# Patient Record
Sex: MALE | Race: WHITE
[De-identification: names, ages, dates, MRNs, and addresses within clinical notes are randomized per-mention and may not be internally consistent; named-entity substitution may affect disease eponyms.]

---

## 2017-03-30 ENCOUNTER — HOSPITAL ENCOUNTER (EMERGENCY)
Dept: HOSPITAL 17 - NEPE | Age: 62
Discharge: HOME | End: 2017-03-30
Payer: MEDICARE

## 2017-03-30 VITALS — TEMPERATURE: 99.6 F

## 2017-03-30 VITALS
SYSTOLIC BLOOD PRESSURE: 151 MMHG | HEART RATE: 77 BPM | DIASTOLIC BLOOD PRESSURE: 80 MMHG | OXYGEN SATURATION: 100 % | RESPIRATION RATE: 16 BRPM

## 2017-03-30 VITALS — WEIGHT: 284.4 LBS | HEIGHT: 69 IN | BODY MASS INDEX: 42.12 KG/M2

## 2017-03-30 VITALS
RESPIRATION RATE: 14 BRPM | SYSTOLIC BLOOD PRESSURE: 139 MMHG | DIASTOLIC BLOOD PRESSURE: 75 MMHG | TEMPERATURE: 99.6 F | HEART RATE: 88 BPM | OXYGEN SATURATION: 95 %

## 2017-03-30 DIAGNOSIS — Z87.39: ICD-10-CM

## 2017-03-30 DIAGNOSIS — G80.9: ICD-10-CM

## 2017-03-30 DIAGNOSIS — I10: ICD-10-CM

## 2017-03-30 DIAGNOSIS — M54.5: Primary | ICD-10-CM

## 2017-03-30 DIAGNOSIS — E78.00: ICD-10-CM

## 2017-03-30 DIAGNOSIS — R19.7: ICD-10-CM

## 2017-03-30 DIAGNOSIS — R11.10: ICD-10-CM

## 2017-03-30 DIAGNOSIS — Z86.79: ICD-10-CM

## 2017-03-30 DIAGNOSIS — Z87.19: ICD-10-CM

## 2017-03-30 DIAGNOSIS — Z86.59: ICD-10-CM

## 2017-03-30 DIAGNOSIS — Z87.448: ICD-10-CM

## 2017-03-30 DIAGNOSIS — F17.200: ICD-10-CM

## 2017-03-30 LAB
ALP SERPL-CCNC: 75 U/L (ref 45–117)
ALT SERPL-CCNC: 31 U/L (ref 12–78)
ANION GAP SERPL CALC-SCNC: 8 MEQ/L (ref 5–15)
AST SERPL-CCNC: 38 U/L (ref 15–37)
BASOPHILS # BLD AUTO: 0 TH/MM3 (ref 0–0.2)
BASOPHILS NFR BLD: 0.2 % (ref 0–2)
BILIRUB SERPL-MCNC: 0.8 MG/DL (ref 0.2–1)
BUN SERPL-MCNC: 21 MG/DL (ref 7–18)
CHLORIDE SERPL-SCNC: 105 MEQ/L (ref 98–107)
COLOR UR: YELLOW
COMMENT (UR): (no result)
CULTURE IF INDICATED: (no result)
EOSINOPHIL # BLD: 0 TH/MM3 (ref 0–0.4)
EOSINOPHIL NFR BLD: 0.2 % (ref 0–4)
ERYTHROCYTE [DISTWIDTH] IN BLOOD BY AUTOMATED COUNT: 12.8 % (ref 11.6–17.2)
GFR SERPLBLD BASED ON 1.73 SQ M-ARVRAT: 60 ML/MIN (ref 89–?)
GLUCOSE UR STRIP-MCNC: (no result) MG/DL
HCO3 BLD-SCNC: 24.1 MEQ/L (ref 21–32)
HCT VFR BLD CALC: 49.7 % (ref 39–51)
HEMO FLAGS: (no result)
HGB UR QL STRIP: (no result)
KETONES UR STRIP-MCNC: (no result) MG/DL
LYMPHOCYTES # BLD AUTO: 0.6 TH/MM3 (ref 1–4.8)
LYMPHOCYTES NFR BLD AUTO: 6.8 % (ref 9–44)
MCH RBC QN AUTO: 32.1 PG (ref 27–34)
MCHC RBC AUTO-ENTMCNC: 34.6 % (ref 32–36)
MCV RBC AUTO: 92.8 FL (ref 80–100)
MONOCYTES NFR BLD: 3.3 % (ref 0–8)
MUCOUS THREADS #/AREA URNS LPF: (no result) /LPF
NEUTROPHILS # BLD AUTO: 8.5 TH/MM3 (ref 1.8–7.7)
NEUTROPHILS NFR BLD AUTO: 89.5 % (ref 16–70)
NITRITE UR QL STRIP: (no result)
PLATELET # BLD: 181 TH/MM3 (ref 150–450)
POTASSIUM SERPL-SCNC: 4.5 MEQ/L (ref 3.5–5.1)
RBC # BLD AUTO: 5.36 MIL/MM3 (ref 4.5–5.9)
SODIUM SERPL-SCNC: 137 MEQ/L (ref 136–145)
SP GR UR STRIP: 1.02 (ref 1–1.03)
SQUAMOUS #/AREA URNS HPF: <1 /HPF (ref 0–5)
WBC # BLD AUTO: 9.5 TH/MM3 (ref 4–11)

## 2017-03-30 PROCEDURE — 80053 COMPREHEN METABOLIC PANEL: CPT

## 2017-03-30 PROCEDURE — 96374 THER/PROPH/DIAG INJ IV PUSH: CPT

## 2017-03-30 PROCEDURE — 81001 URINALYSIS AUTO W/SCOPE: CPT

## 2017-03-30 PROCEDURE — 74176 CT ABD & PELVIS W/O CONTRAST: CPT

## 2017-03-30 PROCEDURE — 96375 TX/PRO/DX INJ NEW DRUG ADDON: CPT

## 2017-03-30 PROCEDURE — 99284 EMERGENCY DEPT VISIT MOD MDM: CPT

## 2017-03-30 PROCEDURE — 85025 COMPLETE CBC W/AUTO DIFF WBC: CPT

## 2017-03-30 PROCEDURE — 96361 HYDRATE IV INFUSION ADD-ON: CPT

## 2017-03-30 NOTE — PD
Data


Data


Last Documented VS





Vital Signs








  Date Time  Temp Pulse Resp B/P Pulse Ox O2 Delivery O2 Flow Rate FiO2


 


3/30/17 06:14 99.6 88 14 139/75 95 Room Air  








Orders





 Complete Blood Count With Diff (3/30/17 06:42)


Comprehensive Metabolic Panel (3/30/17 06:42)


Urinalysis - C+S If Indicated (3/30/17 06:42)


Ct Abd/Pel W/O Iv Contrast (3/30/17 )


Ondansetron Inj (Zofran Inj) (3/30/17 06:45)


Sodium Chlor 0.9% 1000 Ml Inj (Ns 1000 M (3/30/17 06:45)


Ketorolac Inj (Toradol Inj) (3/30/17 06:45)





Labs





 Laboratory Tests








Test 3/30/17 3/30/17





 06:46 07:10


 


White Blood Count 9.5 TH/MM3 


 


Red Blood Count 5.36 MIL/MM3 


 


Hemoglobin 17.2 GM/DL 


 


Hematocrit 49.7 % 


 


Mean Corpuscular Volume 92.8 FL 


 


Mean Corpuscular Hemoglobin 32.1 PG 


 


Mean Corpuscular Hemoglobin 34.6 % 





Concent  


 


Red Cell Distribution Width 12.8 % 


 


Platelet Count 181 TH/MM3 


 


Mean Platelet Volume 7.8 FL 


 


Neutrophils (%) (Auto) 89.5 % 


 


Lymphocytes (%) (Auto) 6.8 % 


 


Monocytes (%) (Auto) 3.3 % 


 


Eosinophils (%) (Auto) 0.2 % 


 


Basophils (%) (Auto) 0.2 % 


 


Neutrophils # (Auto) 8.5 TH/MM3 


 


Lymphocytes # (Auto) 0.6 TH/MM3 


 


Monocytes # (Auto) 0.3 TH/MM3 


 


Eosinophils # (Auto) 0.0 TH/MM3 


 


Basophils # (Auto) 0.0 TH/MM3 


 


CBC Comment DIFF FINAL  


 


Differential Comment   


 


Sodium Level 137 MEQ/L 


 


Potassium Level 4.5 MEQ/L 


 


Chloride Level 105 MEQ/L 


 


Carbon Dioxide Level 24.1 MEQ/L 


 


Anion Gap 8 MEQ/L 


 


Blood Urea Nitrogen 21 MG/DL 


 


Creatinine 1.23 MG/DL 


 


Estimat Glomerular Filtration 60 ML/MIN 





Rate  


 


Random Glucose 102 MG/DL 


 


Calcium Level 9.0 MG/DL 


 


Total Bilirubin 0.8 MG/DL 


 


Aspartate Amino Transf 38 U/L 





(AST/SGOT)  


 


Alanine Aminotransferase 31 U/L 





(ALT/SGPT)  


 


Alkaline Phosphatase 75 U/L 


 


Total Protein 7.3 GM/DL 


 


Albumin 3.8 GM/DL 


 


Urine Color  YELLOW 


 


Urine Turbidity  CLEAR 


 


Urine pH  5.0 


 


Urine Specific Gravity  1.022 


 


Urine Protein  NEG mg/dL


 


Urine Glucose (UA)  NEG mg/dL


 


Urine Ketones  NEG mg/dL


 


Urine Occult Blood  NEG 


 


Urine Nitrite  NEG 


 


Urine Bilirubin  NEG 


 


Urine Urobilinogen  LESS THAN 2.0





  MG/DL


 


Urine Leukocyte Esterase  NEG 


 


Urine RBC  LESS THAN 1





  /hpf


 


Urine WBC  LESS THAN 1





  /hpf


 


Urine Squamous Epithelial  <1 /hpf





Cells  


 


Urine Mucus  FEW /lpf


 


Microscopic Urinalysis Comment  CULT NOT





  INDICATED











MDM


Supervised Visit with ALIZE:  No


Narrative Course


This case is checked out to me by Dr. Mcwilliams at 7 AM.





I have reevaluated the patient and discussed the entirety of the workup with 

him.  The urine is clean and labs are normal.  CT abdomen and pelvis shows no 

sign of kidney stone.  There are some incidental findings such as 

cholelithiasis and diverticulosis.  He is resting comfortably and feels much 

better.  He is stable for outpatient primary care follow-up.


Diagnosis





 Primary Impression:  


 Back pain


 Qualified Code:  M54.5 - Acute low back pain without sciatica, unspecified 

back pain laterality





***Additional Instruction:


The patient was advised to follow up with their physician and return if they 

worsen.


***Med/Other Pt SpecificInfo:  Other


Disposition:  01 DISCHARGE HOME


Condition:  Stable








Florencio Kumar MD Mar 30, 2017 08:39

## 2017-03-30 NOTE — PD
HPI


Chief Complaint:  Flank/Kidney Pain


Time Seen by Provider:  06:38


Travel History


International Travel<30 days:  No


Contact w/Intl Traveler<30days:  No


Traveled to known affect area:  No





History of Present Illness


HPI


62yo M with PMH of bipolar disorder and nephrolithiasis presents to the ED with 

c/o right lower back pain since last night.  +NBNB vomiting and nonbloody 

diarrhea last night.  Did not take any pain medication.  Pain is localized, 

sharp and nonradiating, worst with movement.  Denies any fever, chest pain, sob

, trauma, focal weakness or numbness, abdominal pain, urinary complaints.





PFSH


Past Medical History


Arthritis:  Yes


Asthma:  No


Autoimmune Disease:  No


Blood Disorders:  No


Bipolar Disorder:  Yes


Anxiety:  Yes


Depression:  Yes


Heart Rhythm Problems:  No


Cancer:  No


Cardiovascular Problems:  Yes (HTN)


Cerebral Palsy:  Yes


High Cholesterol:  Yes


Chemotherapy:  No


Chest Pain:  No


Congestive Heart Failure:  No


COPD:  No


Cerebrovascular Accident:  No


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


Diabetes:  No


Diminished Hearing:  No


Diverticulitis:  Yes


Endocrine:  No


Gastrointestinal Disorders:  Yes (DIVERTICULITIS)


GERD:  Yes


Glaucoma:  No


Genitourinary:  No


Headaches:  No


Hepatitis:  No


Hiatal Hernia:  No


Hypertension:  Yes


Immune Disorder:  No


Implanted Vascular Access Dvce:  Yes (PT STATES "TWO TOTAL KNEE REPLACEMENTS")


Kidney Stones:  Yes (WITH LITHOTRIPSY)


Musculoskeletal:  Yes


Neurologic:  No


Psychiatric:  Yes


Reproductive:  No


Respiratory:  No


Immunizations Current:  Yes


Migraines:  No


Myocardial Infarction:  No


Radiation Therapy:  No


Renal Failure:  No


Seizures:  No


Sickle Cell Disease:  No


Sleep Apnea:  No


Thyroid Disease:  No


Ulcer:  No


PNEUMOCCOCAL Vaccine (Year):  2010





Past Surgical History


Abdominal Surgery:  No


AICD:  No


Appendectomy:  No


Arteriovenous Shunt:  No


Cardiac Surgery:  No


Cholecystectomy:  No


Ear Surgery:  No


Endocrine Surgery:  No


Eye Surgery:  No


Genitourinary Surgery:  No


Gynecologic Surgery:  No


Insulin Pump:  No


Joint Replacement:  Yes (BILATERAL KNEES)


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 

now per patient repor)


Oral Surgery:  No


Pacemaker:  No


Thoracic Surgery:  No


Tonsillectomy:  Yes


Other Surgery:  Yes





Social History


Alcohol Use:  No


Tobacco Use:  Yes (1PPD)


Substance Use:  No





Allergies-Medications


(Allergen,Severity, Reaction):  


Coded Allergies:  


     Keflex (Verified  Allergy, Severe, RASH, 3/30/17)


Reported Meds & Prescriptions





Reported Meds & Active Scripts


Active


Reported


Bupropion HCl 100 Mg Tab 150 Mg PO HS


Ziprasidone 60 Mg Cap 120 Mg PO HS








Review of Systems


Except as stated in HPI:  all other systems reviewed are Neg





Physical Exam


Narrative


GENERAL: 62yo M not in distress.


SKIN: Focused skin assessment warm/dry.


HEAD: Atraumatic. Normocephalic. 


CARDIOVASCULAR: Regular rate and rhythm.  No murmur appreciated.


RESPIRATORY: No accessory muscle use. Clear to auscultation. Breath sounds 

equal bilaterally. 


GASTROINTESTINAL: Abdomen soft, non-tender, nondistended. No rebound tenderness 

or guarding.


BACK: No midline ttp thoracic or lumbar spine.  +TTP right paraspinal lumbar 

spine.  Negative straight leg test.


MUSCULOSKELETAL: No obvious deformities. No clubbing.  No cyanosis.  +Bilateral 

lower ext edema. 


NEUROLOGICAL: Awake and alert. No obvious cranial nerve deficits.  Motor 

grossly within normal limits. Normal speech.


PSYCHIATRIC: Appropriate mood and affect; insight and judgment normal.





Data


Data


Last Documented VS





Vital Signs








  Date Time  Temp Pulse Resp B/P Pulse Ox O2 Delivery O2 Flow Rate FiO2


 


3/30/17 09:00        


 


3/30/17 08:49  77 16  100 Room Air  


 


3/30/17 06:14 99.6       








Orders





 Complete Blood Count With Diff (3/30/17 06:42)


Comprehensive Metabolic Panel (3/30/17 06:42)


Urinalysis - C+S If Indicated (3/30/17 06:42)


Ct Abd/Pel W/O Iv Contrast (3/30/17 )


Ondansetron Inj (Zofran Inj) (3/30/17 06:45)


Sodium Chlor 0.9% 1000 Ml Inj (Ns 1000 M (3/30/17 06:45)


Ketorolac Inj (Toradol Inj) (3/30/17 06:45)





Labs





 Laboratory Tests








Test 3/30/17 3/30/17





 06:46 07:10


 


White Blood Count 9.5 TH/MM3 


 


Red Blood Count 5.36 MIL/MM3 


 


Hemoglobin 17.2 GM/DL 


 


Hematocrit 49.7 % 


 


Mean Corpuscular Volume 92.8 FL 


 


Mean Corpuscular Hemoglobin 32.1 PG 


 


Mean Corpuscular Hemoglobin 34.6 % 





Concent  


 


Red Cell Distribution Width 12.8 % 


 


Platelet Count 181 TH/MM3 


 


Mean Platelet Volume 7.8 FL 


 


Neutrophils (%) (Auto) 89.5 % 


 


Lymphocytes (%) (Auto) 6.8 % 


 


Monocytes (%) (Auto) 3.3 % 


 


Eosinophils (%) (Auto) 0.2 % 


 


Basophils (%) (Auto) 0.2 % 


 


Neutrophils # (Auto) 8.5 TH/MM3 


 


Lymphocytes # (Auto) 0.6 TH/MM3 


 


Monocytes # (Auto) 0.3 TH/MM3 


 


Eosinophils # (Auto) 0.0 TH/MM3 


 


Basophils # (Auto) 0.0 TH/MM3 


 


CBC Comment DIFF FINAL  


 


Differential Comment   


 


Sodium Level 137 MEQ/L 


 


Potassium Level 4.5 MEQ/L 


 


Chloride Level 105 MEQ/L 


 


Carbon Dioxide Level 24.1 MEQ/L 


 


Anion Gap 8 MEQ/L 


 


Blood Urea Nitrogen 21 MG/DL 


 


Creatinine 1.23 MG/DL 


 


Estimat Glomerular Filtration 60 ML/MIN 





Rate  


 


Random Glucose 102 MG/DL 


 


Calcium Level 9.0 MG/DL 


 


Total Bilirubin 0.8 MG/DL 


 


Aspartate Amino Transf 38 U/L 





(AST/SGOT)  


 


Alanine Aminotransferase 31 U/L 





(ALT/SGPT)  


 


Alkaline Phosphatase 75 U/L 


 


Total Protein 7.3 GM/DL 


 


Albumin 3.8 GM/DL 


 


Urine Color  YELLOW 


 


Urine Turbidity  CLEAR 


 


Urine pH  5.0 


 


Urine Specific Gravity  1.022 


 


Urine Protein  NEG mg/dL


 


Urine Glucose (UA)  NEG mg/dL


 


Urine Ketones  NEG mg/dL


 


Urine Occult Blood  NEG 


 


Urine Nitrite  NEG 


 


Urine Bilirubin  NEG 


 


Urine Urobilinogen  LESS THAN 2.0





  MG/DL


 


Urine Leukocyte Esterase  NEG 


 


Urine RBC  LESS THAN 1





  /hpf


 


Urine WBC  LESS THAN 1





  /hpf


 


Urine Squamous Epithelial  <1 /hpf





Cells  


 


Urine Mucus  FEW /lpf


 


Microscopic Urinalysis Comment  CULT NOT





  INDICATED











MDM


Medical Decision Making


Medical Screen Exam Complete:  Yes


Emergency Medical Condition:  Yes


Differential Diagnosis


Musculoskeletal pain vs. nephrolithiasis vs. pyelonephritis vs. gastroenteritis


Narrative Course


62yo M with right lower back pain.  Impression is more musculoskeletal but pt 

also has history of nephrolithiasis.  No red flags.  Pt seen at end of my shift

, sign out to next team to follow up labs, UA, CTa/p without contrast.





Diagnosis





 Primary Impression:  


 Back pain


 Qualified Code:  M54.5 - Acute low back pain without sciatica, unspecified 

back pain laterality








Sanjana Mcwilliams DO Mar 30, 2017 06:47

## 2017-03-30 NOTE — RADRPT
EXAM DATE/TIME:  03/30/2017 07:08 

 

[facility] COMPARISON:     

CT ABDOMEN & PELVIS W/O CONTRAST, July 27, 2013, 3:49.

 

 

INDICATIONS :     

Right flank pain.

                  

 

ORAL CONTRAST:      

No oral contrast ingested.

                  

 

RADIATION DOSE:     

33.82 CTDIvol (mGy) 

 

 

MEDICAL HISTORY :     

Renal calculi. Hypertension. Cerebral palsy.

 

SURGICAL HISTORY :      

None. 

 

ENCOUNTER:      

Initial

 

ACUITY:      

1 day

 

PAIN SCALE:      

7/10

 

LOCATION:       

Right flank 

 

TECHNIQUE:     

Volumetric scanning of the abdomen and pelvis was performed.  Using automated exposure control and ad
justment of the mA and/or kV according to patient size, radiation dose was kept as low as reasonably 
achievable to obtain optimal diagnostic quality images. 

 

FINDINGS:     

 

LOWER LUNGS:     

The visualized lower lungs are clear.

 

LIVER:     

Homogeneous density without lesion.  There is no dilation of the biliary tree. Multiple calcified gal
lstones.

 

SPLEEN:     

Normal size without lesion.

 

PANCREAS:     

Within normal limits. 

 

KIDNEYS:     

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  There is no mass, stone, or hydronephrosis.

 

ADRENAL GLANDS:     

Within normal limits.

 

VASCULAR:     

There is no aortic aneurysm.

 

BOWEL/MESENTERY:     

Diverticulosis.  There is no free intraperitoneal air or fluid. Appendix not well-seen. No inflammato
ry changes right lower quadrant. 

 

ABDOMINAL WALL:     

Small fat-containing umbilical hernia.

 

RETROPERITONEUM:     

There is no lymphadenopathy.

 

BLADDER:     

No wall thickening or mass.

 

REPRODUCTIVE:     

Within normal limits.

 

INGUINAL:     

There is no lymphadenopathy or hernia.

 

MUSCULOSKELETAL:     

Spondylolisthesis L5 on S1 with degenerative changes.

 

CONCLUSION:     

1. Cholelithiasis.

2. Diverticulosis of the colon without diverticulitis.

3. Small hiatal hernia.

4. No renal calculi or hydronephrosis.

 

 

 

 Douglas Goss MD on March 30, 2017 at 7:43           

Board Certified Radiologist.

 This report was verified electronically.

## 2017-12-22 ENCOUNTER — HOSPITAL ENCOUNTER (INPATIENT)
Dept: HOSPITAL 17 - NEPE | Age: 62
LOS: 7 days | Discharge: HOME HEALTH | DRG: 872 | End: 2017-12-29
Attending: HOSPITALIST | Admitting: HOSPITALIST
Payer: MEDICARE

## 2017-12-22 VITALS
DIASTOLIC BLOOD PRESSURE: 68 MMHG | OXYGEN SATURATION: 95 % | SYSTOLIC BLOOD PRESSURE: 142 MMHG | TEMPERATURE: 101.8 F | RESPIRATION RATE: 18 BRPM | HEART RATE: 103 BPM

## 2017-12-22 VITALS
RESPIRATION RATE: 20 BRPM | HEART RATE: 88 BPM | OXYGEN SATURATION: 95 % | SYSTOLIC BLOOD PRESSURE: 114 MMHG | DIASTOLIC BLOOD PRESSURE: 66 MMHG

## 2017-12-22 VITALS
OXYGEN SATURATION: 95 % | DIASTOLIC BLOOD PRESSURE: 68 MMHG | SYSTOLIC BLOOD PRESSURE: 142 MMHG | HEART RATE: 70 BPM | TEMPERATURE: 101.8 F | RESPIRATION RATE: 18 BRPM

## 2017-12-22 VITALS
OXYGEN SATURATION: 97 % | DIASTOLIC BLOOD PRESSURE: 66 MMHG | HEART RATE: 84 BPM | TEMPERATURE: 98.4 F | SYSTOLIC BLOOD PRESSURE: 133 MMHG | RESPIRATION RATE: 18 BRPM

## 2017-12-22 VITALS — HEIGHT: 69 IN | WEIGHT: 280.87 LBS | BODY MASS INDEX: 41.6 KG/M2

## 2017-12-22 VITALS — RESPIRATION RATE: 18 BRPM | OXYGEN SATURATION: 95 %

## 2017-12-22 DIAGNOSIS — E87.6: ICD-10-CM

## 2017-12-22 DIAGNOSIS — Z96.653: ICD-10-CM

## 2017-12-22 DIAGNOSIS — L03.115: ICD-10-CM

## 2017-12-22 DIAGNOSIS — E87.1: ICD-10-CM

## 2017-12-22 DIAGNOSIS — R62.59: ICD-10-CM

## 2017-12-22 DIAGNOSIS — R19.7: ICD-10-CM

## 2017-12-22 DIAGNOSIS — L03.116: ICD-10-CM

## 2017-12-22 DIAGNOSIS — F41.9: ICD-10-CM

## 2017-12-22 DIAGNOSIS — E66.01: ICD-10-CM

## 2017-12-22 DIAGNOSIS — F32.9: ICD-10-CM

## 2017-12-22 DIAGNOSIS — E86.0: ICD-10-CM

## 2017-12-22 DIAGNOSIS — A41.9: Primary | ICD-10-CM
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
## 2017-12-22 DIAGNOSIS — I10: ICD-10-CM

## 2017-12-22 DIAGNOSIS — N17.9: ICD-10-CM

## 2017-12-22 DIAGNOSIS — E78.5: ICD-10-CM

## 2017-12-22 DIAGNOSIS — M62.82: ICD-10-CM

## 2017-12-22 DIAGNOSIS — R65.20: ICD-10-CM
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
## 2017-12-22 DIAGNOSIS — G80.9: ICD-10-CM

## 2017-12-22 DIAGNOSIS — R29.6: ICD-10-CM

## 2017-12-22 LAB
ALBUMIN SERPL-MCNC: 2.9 GM/DL (ref 3.4–5)
ALP SERPL-CCNC: 86 U/L (ref 45–117)
ALT SERPL-CCNC: 42 U/L (ref 12–78)
AST SERPL-CCNC: 58 U/L (ref 15–37)
BACTERIA #/AREA URNS HPF: (no result) /HPF
BASOPHILS # BLD AUTO: 0 TH/MM3 (ref 0–0.2)
BASOPHILS NFR BLD: 0.1 % (ref 0–2)
BILIRUB SERPL-MCNC: 1.3 MG/DL (ref 0.2–1)
BUN SERPL-MCNC: 14 MG/DL (ref 7–18)
CALCIUM SERPL-MCNC: 8.2 MG/DL (ref 8.5–10.1)
CHLORIDE SERPL-SCNC: 96 MEQ/L (ref 98–107)
COLOR UR: YELLOW
CREAT SERPL-MCNC: 1.52 MG/DL (ref 0.6–1.3)
EOSINOPHIL # BLD: 0 TH/MM3 (ref 0–0.4)
EOSINOPHIL NFR BLD: 0 % (ref 0–4)
ERYTHROCYTE [DISTWIDTH] IN BLOOD BY AUTOMATED COUNT: 12.8 % (ref 11.6–17.2)
GFR SERPLBLD BASED ON 1.73 SQ M-ARVRAT: 47 ML/MIN (ref 89–?)
GLUCOSE SERPL-MCNC: 75 MG/DL (ref 74–106)
GLUCOSE UR STRIP-MCNC: (no result) MG/DL
HCO3 BLD-SCNC: 26.5 MEQ/L (ref 21–32)
HCT VFR BLD CALC: 42.1 % (ref 39–51)
HGB BLD-MCNC: 15.1 GM/DL (ref 13–17)
HGB UR QL STRIP: (no result)
HYALINE CASTS #/AREA URNS LPF: 1 /LPF
INR PPP: 1.3 RATIO
KETONES UR STRIP-MCNC: 10 MG/DL
LYMPHOCYTES # BLD AUTO: 1.1 TH/MM3 (ref 1–4.8)
LYMPHOCYTES NFR BLD AUTO: 5.7 % (ref 9–44)
LYMPHOCYTES: 2 % (ref 9–44)
MCH RBC QN AUTO: 32.5 PG (ref 27–34)
MCHC RBC AUTO-ENTMCNC: 35.9 % (ref 32–36)
MCV RBC AUTO: 90.3 FL (ref 80–100)
MONOCYTE #: 1.4 TH/MM3 (ref 0–0.9)
MONOCYTES NFR BLD: 7.3 % (ref 0–8)
MONOCYTES: 5 % (ref 0–8)
MUCOUS THREADS #/AREA URNS LPF: (no result) /LPF
NEUTROPHILS # BLD AUTO: 16.8 TH/MM3 (ref 1.8–7.7)
NEUTROPHILS NFR BLD AUTO: 86.9 % (ref 16–70)
NEUTS BAND # BLD MANUAL: 17.9 TH/MM3 (ref 1.8–7.7)
NEUTS BAND NFR BLD: 13 % (ref 0–6)
NEUTS SEG NFR BLD MANUAL: 80 % (ref 16–70)
NITRITE UR QL STRIP: (no result)
PLATELET # BLD: 204 TH/MM3 (ref 150–450)
PMV BLD AUTO: 7.4 FL (ref 7–11)
PROT SERPL-MCNC: 7.3 GM/DL (ref 6.4–8.2)
PROTHROMBIN TIME: 13.4 SEC (ref 9.8–11.6)
RBC # BLD AUTO: 4.66 MIL/MM3 (ref 4.5–5.9)
SODIUM SERPL-SCNC: 132 MEQ/L (ref 136–145)
SP GR UR STRIP: 1.02 (ref 1–1.03)
SQUAMOUS #/AREA URNS HPF: <1 /HPF (ref 0–5)
TOXIC GRANULES BLD QL SMEAR: (no result)
URINE LEUKOCYTE ESTERASE: (no result)
WBC # BLD AUTO: 19.3 TH/MM3 (ref 4–11)

## 2017-12-22 PROCEDURE — 86403 PARTICLE AGGLUT ANTBDY SCRN: CPT

## 2017-12-22 PROCEDURE — 80202 ASSAY OF VANCOMYCIN: CPT

## 2017-12-22 PROCEDURE — 76937 US GUIDE VASCULAR ACCESS: CPT

## 2017-12-22 PROCEDURE — 87040 BLOOD CULTURE FOR BACTERIA: CPT

## 2017-12-22 PROCEDURE — 85610 PROTHROMBIN TIME: CPT

## 2017-12-22 PROCEDURE — 85025 COMPLETE CBC W/AUTO DIFF WBC: CPT

## 2017-12-22 PROCEDURE — 83605 ASSAY OF LACTIC ACID: CPT

## 2017-12-22 PROCEDURE — 96374 THER/PROPH/DIAG INJ IV PUSH: CPT

## 2017-12-22 PROCEDURE — 85027 COMPLETE CBC AUTOMATED: CPT

## 2017-12-22 PROCEDURE — 82552 ASSAY OF CPK IN BLOOD: CPT

## 2017-12-22 PROCEDURE — 70450 CT HEAD/BRAIN W/O DYE: CPT

## 2017-12-22 PROCEDURE — 85007 BL SMEAR W/DIFF WBC COUNT: CPT

## 2017-12-22 PROCEDURE — 80053 COMPREHEN METABOLIC PANEL: CPT

## 2017-12-22 PROCEDURE — 71010: CPT

## 2017-12-22 PROCEDURE — 80074 ACUTE HEPATITIS PANEL: CPT

## 2017-12-22 PROCEDURE — 80048 BASIC METABOLIC PNL TOTAL CA: CPT

## 2017-12-22 PROCEDURE — 83735 ASSAY OF MAGNESIUM: CPT

## 2017-12-22 PROCEDURE — 85730 THROMBOPLASTIN TIME PARTIAL: CPT

## 2017-12-22 PROCEDURE — 87205 SMEAR GRAM STAIN: CPT

## 2017-12-22 PROCEDURE — 81001 URINALYSIS AUTO W/SCOPE: CPT

## 2017-12-22 PROCEDURE — 96361 HYDRATE IV INFUSION ADD-ON: CPT

## 2017-12-22 PROCEDURE — 87493 C DIFF AMPLIFIED PROBE: CPT

## 2017-12-22 PROCEDURE — 82550 ASSAY OF CK (CPK): CPT

## 2017-12-22 PROCEDURE — 84100 ASSAY OF PHOSPHORUS: CPT

## 2017-12-22 NOTE — PD
HPI


Chief Complaint:  Fall


Time Seen by Provider:  18:34


Travel History


International Travel<30 days:  No


Contact w/Intl Traveler<30days:  No


Traveled to known affect area:  No





History of Present Illness


HPI


62-year-old male with PMH of hypertension and cerebral palsy presents to the ED 

via EMS for evaluation after multiple falls at home.  On presentation the 

patient states that he's been feeling dizzy lately and has had multiple falls.  

He denies hitting his head or loss of consciousness.  On presentation he denies 

neck or back pain.  He complains of dizziness, cough, loose stools 1 week.  He 

endorses chills but has not measured a fever at home.  He denies headache, 

vision changes, chest pain, palpitations, shortness of breath, abdominal pain, 

hematochezia, melena, dysuria, hematuria.  He states that he does not have a 

primary care provider.  He does not take blood thinners.





PFSH


Past Medical History


Arthritis:  Yes


Asthma:  No


Autoimmune Disease:  No


Blood Disorders:  No


Bipolar Disorder:  Yes


Anxiety:  Yes


Depression:  Yes


Heart Rhythm Problems:  No


Cancer:  No


Cardiovascular Problems:  Yes (HTN )


Cerebral Palsy:  Yes


High Cholesterol:  Yes


Chemotherapy:  No


Chest Pain:  No


Congestive Heart Failure:  No


COPD:  No


Cerebrovascular Accident:  No


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


Diabetes:  No


Diminished Hearing:  No


Diverticulitis:  Yes


Endocrine:  No


Gastrointestinal Disorders:  Yes (DIVERTICULITIS)


GERD:  Yes


Glaucoma:  No


Genitourinary:  No


Headaches:  No


Hepatitis:  No


Hiatal Hernia:  No


Hypertension:  Yes


Immune Disorder:  No


Implanted Vascular Access Dvce:  Yes (PT STATES "TWO TOTAL KNEE REPLACEMENTS")


Kidney Stones:  Yes (WITH LITHOTRIPSY)


Musculoskeletal:  Yes


Neurologic:  No


Psychiatric:  Yes


Reproductive:  No


Respiratory:  No


Immunizations Current:  Yes


Migraines:  No


Myocardial Infarction:  No


Radiation Therapy:  No


Renal Failure:  No


Seizures:  No


Sickle Cell Disease:  No


Sleep Apnea:  No


Thyroid Disease:  No


Ulcer:  No


PNEUMOCCOCAL Vaccine (Year):  2010





Past Surgical History


Abdominal Surgery:  No


AICD:  No


Appendectomy:  No


Arteriovenous Shunt:  No


Cardiac Surgery:  No


Cholecystectomy:  No


Ear Surgery:  No


Endocrine Surgery:  No


Eye Surgery:  No


Genitourinary Surgery:  No


Gynecologic Surgery:  No


Insulin Pump:  No


Joint Replacement:  Yes (BILATERAL KNEES)


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 

now per patient repor)


Oral Surgery:  No


Pacemaker:  No


Thoracic Surgery:  No


Tonsillectomy:  Yes


Other Surgery:  Yes





Social History


Alcohol Use:  No


Tobacco Use:  No


Substance Use:  No





Allergies-Medications


(Allergen,Severity, Reaction):  


Coded Allergies:  


     cephalexin (Unverified  Allergy, Severe, RASH, 8/15/17)


Reported Meds & Prescriptions





Reported Meds & Active Scripts


Active


Reported


Bupropion HCl 100 Mg Tab 150 Mg PO HS


Ziprasidone 60 Mg Cap 120 Mg PO HS





Review of Systems


Except as stated in HPI:  all other systems reviewed are Neg





Physical Exam


Narrative


GENERAL: Well-nourished, well-developed patient.  Lying on his side on a 

backboard.  C-collar in place.


SKIN: Focused skin assessment warm/dry.  Bilateral lower extremities with 

multiple small wounds, erythema, edema, warmth, serosanguineous drainage.


HEAD: Normocephalic.  Atraumatic.


EYES: No scleral icterus. No injection or drainage.  PERRLA.  EOMI.


NECK: Supple, trachea midline. No JVD or lymphadenopathy.  No midline 

tenderness to palpation.  No limitations to range of motion.


CARDIOVASCULAR: Regular rate and rhythm without murmurs, gallops, or rubs. 


RESPIRATORY: Breath sounds equal bilaterally. No accessory muscle use.


GASTROINTESTINAL: Abdomen soft, non-tender, nondistended. 


MUSCULOSKELETAL: No cyanosis.  Tender edema of the bilateral lower extremities 

to the knee.  


NEUROLOGICAL: Awake and alert. Cranial nerves II through XII intact.  Motor and 

sensory grossly within normal  limits. Five out of 5 muscle strength in all 

muscle groups.  Normal speech. 


BACK: Nontender without obvious deformity. No CVA tenderness.





Data


Data


Last Documented VS





Vital Signs








  Date Time  Temp Pulse Resp B/P (MAP) Pulse Ox O2 Delivery O2 Flow Rate FiO2


 


12/22/17 21:54  88 20 114/66 (82) 95 Room Air  


 


12/22/17 18:31 101.8       








Orders





 Orders


Complete Blood Count With Diff (12/22/17 18:55)


Comprehensive Metabolic Panel (12/22/17 18:55)


Lactic Acid (12/22/17 18:55)


Prothrombin Time / Inr (Pt) (12/22/17 18:55)


Act Partial Throm Time (Ptt) (12/22/17 18:55)


Urinalysis - C+S If Indicated (12/22/17 18:55)


Iv Access Insert/Monitor (12/22/17 18:55)


Ecg Monitoring (12/22/17 18:55)


Oximetry (12/22/17 18:55)


Sodium Chloride 0.9% Flush (Ns Flush) (12/22/17 19:00)


Sodium Chlor 0.9% 1000 Ml Inj (Ns 1000 M (12/22/17 20:00)


Acetaminophen (Tylenol) (12/22/17 20:00)


Sepsis Workup Initiated (12/22/17 )


Blood Culture (12/22/17 19:59)


Chest, Single Ap (12/22/17 19:59)


Ct Brain W/O Iv Contrast(Rout) (12/22/17 )


Sodium Chlor 0.9% 1000 Ml Inj (Ns 1000 M (12/22/17 21:00)


Clindamycin Inj (Cleocin Inj) (12/22/17 21:00)


Cath For Specimen (12/22/17 21:01)





Labs





Laboratory Tests








Test


  12/22/17


19:21


 


White Blood Count 19.3 TH/MM3 


 


Red Blood Count 4.66 MIL/MM3 


 


Hemoglobin 15.1 GM/DL 


 


Hematocrit 42.1 % 


 


Mean Corpuscular Volume 90.3 FL 


 


Mean Corpuscular Hemoglobin 32.5 PG 


 


Mean Corpuscular Hemoglobin


Concent 35.9 % 


 


 


Red Cell Distribution Width 12.8 % 


 


Platelet Count 204 TH/MM3 


 


Mean Platelet Volume 7.4 FL 


 


Neutrophils (%) (Auto) 86.9 % 


 


Lymphocytes (%) (Auto) 5.7 % 


 


Monocytes (%) (Auto) 7.3 % 


 


Eosinophils (%) (Auto) 0.0 % 


 


Basophils (%) (Auto) 0.1 % 


 


Neutrophils # (Auto) 16.8 TH/MM3 


 


Lymphocytes # (Auto) 1.1 TH/MM3 


 


Monocytes # (Auto) 1.4 TH/MM3 


 


Eosinophils # (Auto) 0.0 TH/MM3 


 


Basophils # (Auto) 0.0 TH/MM3 


 


CBC Comment AUTO DIFF 


 


Differential Total Cells


Counted 100 


 


 


Neutrophils % (Manual) 80 % 


 


Band Neutrophils % 13 % 


 


Lymphocytes % 2 % 


 


Monocytes % 5 % 


 


Neutrophils # (Manual) 17.9 TH/MM3 


 


Differential Comment


  FINAL DIFF


MANUAL


 


Toxic Granulation 2+ 


 


Platelet Estimate NORMAL 


 


Platelet Morphology Comment NORMAL 


 


Prothrombin Time 13.4 SEC 


 


Prothromb Time International


Ratio 1.3 RATIO 


 


 


Activated Partial


Thromboplast Time 33.3 SEC 


 


 


Blood Urea Nitrogen 14 MG/DL 


 


Creatinine 1.52 MG/DL 


 


Random Glucose 75 MG/DL 


 


Total Protein 7.3 GM/DL 


 


Albumin 2.9 GM/DL 


 


Calcium Level 8.2 MG/DL 


 


Alkaline Phosphatase 86 U/L 


 


Aspartate Amino Transf


(AST/SGOT) 58 U/L 


 


 


Alanine Aminotransferase


(ALT/SGPT) 42 U/L 


 


 


Total Bilirubin 1.3 MG/DL 


 


Sodium Level 132 MEQ/L 


 


Potassium Level 3.3 MEQ/L 


 


Chloride Level 96 MEQ/L 


 


Carbon Dioxide Level 26.5 MEQ/L 


 


Anion Gap 10 MEQ/L 


 


Estimat Glomerular Filtration


Rate 47 ML/MIN 


 


 


Lactic Acid Level 3.1 mmol/L 











[facility]


Medical Decision Making


Medical Screen Exam Complete:  Yes


Emergency Medical Condition:  Yes


Differential Diagnosis


Cellulitis versus sepsis versus chronic falls versus metabolic derangement 

versus dehydration versus other


Narrative Course


62-year-old male with PMH of hypertension and cerebral palsy presents to the ED 

via EMS for evaluation after multiple falls at home. He denies hitting his head 

or loss of consciousness. On presentation he denies neck or back pain.  He 

complains of dizziness, cough, loose stools 1 week.  He endorses chills but 

has not measured a fever at home.  Denies blood thinners.  Vitals reviewed.  

Patient's febrile, or cardiac and hypertensive on presentation.  On exam the 

patient is lying on his side on a backboard.  He is wearing a c-collar.  

Patient was cleared off the backboard.  He denies neck pain and has no 

tenderness to palpation or limitation to range of motion of the neck.  C-collar 

was removed.  No focal neuro deficits noted.  There is multiple small wounds of 

the lower extremities with warm, tender edema and erythema to the mid shins 

bilaterally.  Left worse than right.  I suspect this is the source of his 

fever.  IV was established.  Patient was administered 2 L normal saline, 1 g 

Tylenol by mouth.  Blood cultures were obtained.  There was some concern of the 

patient may have bedbugs.  I see no evidence of this on the exam.





CBC: WBC 19.3 with left shift and 13 bands.


CMP: Sodium 132, coronary 6.  Potassium 3.3.  BUN 14.  Creatinine 1.52.  

Calcium 8.2.  Bilirubin 1.3, AST 58, ALT 42.


Lactic acid 3.1.


UA: Pending.


CT brain: Pending





Patient was administered 900 mg clindamycin IV.  Patient meets sepsis criteria.

  I discussed the results of workup with the patient as well as the need for 

admission to the hospital.  He is agreeable to that plan.  I spoke with Dr. Fuller who agrees to accept the patient to the medicine service.  Please see 

medicine notes for disposition.











Ximena Sorto Dec 22, 2017 19:58

## 2017-12-22 NOTE — HHI.HP
__________________________________________________





HPI


Service


[facility]


Primary Care Physician


Unknown


Admission Diagnosis





sepsis, BLE cellulitis


Diagnoses:  


(1) Sepsis


Diagnosis:  Principal





(2) Cellulitis


Diagnosis:  Principal





(3) Renal insufficiency


Diagnosis:  Principal





(4) Cerebral palsy


Diagnosis:  Principal





(5) Fall


Diagnosis:  Principal





Travel History


International Travel<30 Days:  No


Contact w/Intl Traveler <30 Da:  No


Traveled to Known Affected Are:  No


History of Present Illness


This is a 62-year-old male with PMH of Anxiety, Depression, HTN, Hyperlipidemia 

and Cerebral Palsy was brought to the ER by EMS secondary to fall at home while 

getting out of the shower.  Denies LOC or head trauma, but reports significant 

dizziness/lightheadedness.  Also reports cough, generalized weakness and 

subjective fever/chills at home.  On arrival, /68, , O2 sat 95% on 

RA, Temp 101.8.  WBC 19.3, bandemia 13%.  Creatinine 1.52, previously 1.23 on 3/

30/17.  Lactic Acid 3.1.  INR 1.3.  UA negative.  CXR with no acute findings.  

CT Head negative.  On exam, patient noted to have lower extremity cellulitis 

bilaterally.  S/p Blood Cultures/Clinda IV in ER.





Review of Systems


Except as stated in HPI:  all other systems reviewed are Neg


ROS: 14 point review of systems otherwise negative.





Past Family Social History


Past Medical History


PMH:  Anxiety, Depression, HTN, Hyperlipidemia and Cerebral Palsy


Past Surgical History


PAST SURGICAL HISTORY:  Bilateral Knee Replacement


Allergies:  


Coded Allergies:  


     cephalexin (Unverified  Allergy, Severe, RASH, 8/15/17)


Family History


PAST FAMILY HISTORY:  Reviewed.  No h/o DM or CAD


Social History


PAST SOCIAL HISTORY:  Negative for alcohol, tobacco or drugs.





Physical Exam


Vital Signs





Vital Signs








  Date Time  Temp Pulse Resp B/P (MAP) Pulse Ox O2 Delivery O2 Flow Rate FiO2


 


17 21:54  88 20 114/66 (82) 95 Room Air  


 


17 19:00   18  95 Room Air  


 


17 18:31  104 18  95 Room Air  


 


17 18:31 101.8 70 18 142/68 (92) 95 Room Air  


 


17 18:27 101.8 103 18 142/68 (92) 95   








Physical Exam


PE:


GENERAL: Middle-aged white male in no acute distress.


HEENT: PERRLA, EOMI. No scleral icterus or conjunctival pallor. No lid lag or 

facial droop.  


CARDIOVASCULAR: Regular rate and rhythm.  No obvious murmurs to auscultation. 

No chest tenderness to palpation. 


RESPIRATORY: No obvious rhonchi or wheezing. Clear to auscultation. Breath 

sounds equal bilaterally. 


GASTROINTESTINAL: Abdomen soft, non-tender, nondistended. BS normal. 


MUSCULOSKELETAL: Extremities without clubbing, cyanosis, or edema. No obvious 

deformities.  Bilateral lower extremity erythema with some warmth, multiple 

wounds noted.


NEUROLOGICAL: Awake, alert. No focal neurologic deficits. Moving both upper and 

lower extremities spontaneously.


Laboratory





Laboratory Tests








Test


  17


19:21


 


White Blood Count 19.3 


 


Red Blood Count 4.66 


 


Hemoglobin 15.1 


 


Hematocrit 42.1 


 


Mean Corpuscular Volume 90.3 


 


Mean Corpuscular Hemoglobin 32.5 


 


Mean Corpuscular Hemoglobin


Concent 35.9 


 


 


Red Cell Distribution Width 12.8 


 


Platelet Count 204 


 


Mean Platelet Volume 7.4 


 


Neutrophils (%) (Auto) 86.9 


 


Lymphocytes (%) (Auto) 5.7 


 


Monocytes (%) (Auto) 7.3 


 


Eosinophils (%) (Auto) 0.0 


 


Basophils (%) (Auto) 0.1 


 


Neutrophils # (Auto) 16.8 


 


Lymphocytes # (Auto) 1.1 


 


Monocytes # (Auto) 1.4 


 


Eosinophils # (Auto) 0.0 


 


Basophils # (Auto) 0.0 


 


CBC Comment AUTO DIFF 


 


Differential Total Cells


Counted 100 


 


 


Neutrophils % (Manual) 80 


 


Band Neutrophils % 13 


 


Lymphocytes % 2 


 


Monocytes % 5 


 


Neutrophils # (Manual) 17.9 


 


Differential Comment


  FINAL DIFF


MANUAL


 


Toxic Granulation 2+ 


 


Platelet Estimate NORMAL 


 


Platelet Morphology Comment NORMAL 


 


Prothrombin Time 13.4 


 


Prothromb Time International


Ratio 1.3 


 


 


Activated Partial


Thromboplast Time 33.3 


 


 


Blood Urea Nitrogen 14 


 


Creatinine 1.52 


 


Random Glucose 75 


 


Total Protein 7.3 


 


Albumin 2.9 


 


Calcium Level 8.2 


 


Alkaline Phosphatase 86 


 


Aspartate Amino Transf


(AST/SGOT) 58 


 


 


Alanine Aminotransferase


(ALT/SGPT) 42 


 


 


Total Bilirubin 1.3 


 


Sodium Level 132 


 


Potassium Level 3.3 


 


Chloride Level 96 


 


Carbon Dioxide Level 26.5 


 


Anion Gap 10 


 


Estimat Glomerular Filtration


Rate 47 


 


 


Lactic Acid Level 3.1 














 Date/Time


Source Procedure


Growth Status


 


 


 17 20:20


Blood Peripheral Aerobic Blood Culture


Pending Received


 


 17 20:20


Blood Peripheral Anaerobic Blood Culture


Pending Received








Result Diagram:  


17








Caprini VTE Risk Assessment


Caprini VTE Risk Assessment:  No/Low Risk (score <= 1)


Caprini Risk Assessment Model











 Point Value = 1          Point Value = 2  Point Value = 3  Point Value = 5


 


Age 41-60


Minor surgery


BMI > 25 kg/m2


Swollen legs


Varicose veins


Pregnancy or postpartum


History of unexplained or recurrent


   spontaneous 


Oral contraceptives or hormone


   replacement


Sepsis (< 1 month)


Serious lung disease, including


   pneumonia (< 1 month)


Abnormal pulmonary function


Acute myocardial infarction


Congestive heart failure (< 1 month)


History of inflammatory bowel disease


Medical patient at bed rest Age 61-74


Arthroscopic surgery


Major open surgery (> 45 min)


Laparoscopic surgery (> 45 min)


Malignancy


Confined to bed (> 72 hours)


Immobilizing plaster cast


Central venous access Age >= 75


History of VTE


Family history of VTE


Factor V Leiden


Prothrombin [id number]


Lupus anticoagulant


Anticardiolipin antibodies


Elevated serum homocysteine


Heparin-induced thrombocytopenia


Other congenital or acquired


   thrombophilia Stroke (< 1 month)


Elective arthroplasty


Hip, pelvis, or leg fracture


Acute spinal cord injury (< 1 month)








Prophylaxis Regimen











   Total Risk


Factor Score Risk Level Prophylaxis Regimen


 


0-1      Low Early ambulation


 


2 Moderate Order ONE of the following:


*Sequential Compression Device (SCD)


*Heparin 5000 units SQ BID


 


3-4 Higher Order ONE of the following medications:


*Heparin 5000 units SQ TID


*Enoxaparin/Lovenox 40 mg SQ daily (WT < 150 kg, CrCl > 30 mL/min)


*Enoxaparin/Lovenox 30 mg SQ daily (WT < 150 kg, CrCl > 10-29 mL/min)


*Enoxaparin/Lovenox 30 mg SQ BID (WT < 150 kg, CrCl > 30 mL/min)


AND/OR


*Sequential Compression Device (SCD)


 


5 or more Highest Order ONE of the following medications:


*Heparin 5000 units SQ TID (Preferred with Epidurals)


*Enoxaparin/Lovenox 40 mg SQ daily (WT < 150 kg, CrCl > 30 mL/min)


*Enoxaparin/Lovenox 30 mg SQ daily (WT < 150 kg, CrCl > 10-29 mL/min)


*Enoxaparin/Lovenox 30 mg SQ BID (WT < 150 kg, CrCl > 30 mL/min)


AND


*Sequential Compression Device (SCD)











Assessment and Plan


Problem List:  


(1) Sepsis


ICD Code:  A41.9 - Sepsis, unspecified organism


(2) Cellulitis


ICD Code:  L03.90 - Cellulitis, unspecified


(3) Renal insufficiency


ICD Code:  N28.9 - Disorder of kidney and ureter, unspecified


(4) Cerebral palsy


ICD Code:  G80.9 - Cerebral palsy, unspecified


(5) Fall


ICD Code:  W19.XXXA - Unspecified fall, initial encounter


Assessment and Plan


A/P:


1.  Sepsis:  Temp 101.8, , WBC 19.3, Lactic Acid 3.1, Source-Cellulitis.  

CXR w/ no acute findings, images reviewed by me.  S/p Blood Cultures/Clinda IV 

in ER.  In light of significant leukocytosis w/ bandemia, will continue w/ broad

-spectrum IV Abx.  Follow up cultures.   Repeat Lactic Acid.


2.  Cellulitis:  Bilateral Lower Extremities.  Treatment as above, follow up 

cultures, continue IV Abx


3.  BRIGITTE:  Acute renal insufficiency, creatinine 1.52, previously 1.23.  UA 

negative for UTI.  IVF for hydration.  Repeat labs in a.m.


4.  Cerebral Palsy:  At baseline.  Patient with mild developmental delay.


5.  Fall:  s/p mechanical trip and fall today, reports multiple falls in last 

few days w/ associated dizziness, likely secondary to acute infection/

dehydration.  CT Head w/ no acute findings, images reviewed by me.  No other 

injuries noted. 


6.  DVT Prophylaxis:  Heparin sq


7.  Social work for d/c planning as needed. 


8.  Case discussed w/ ER physician at length.





Physician Certification


2 Midnight Certification Type:  Admission for Inpatient Services


Order for Inpatient Services


The services are ordered in accordance with Medicare regulations or non-

Medicare payer requirements, as applicable.  In the case of services not 

specified as inpatient-only, they are appropriately provided as inpatient 

services in accordance with the 2-midnight benchmark.


Estimated LOS (days):  2


 days is the estimated time the patient will need to remain in the hospital, 

assuming treatment plan goals are met and no additional complications.


Post-Hospital Plan:  Not yet determined











Amber Fuller MD Dec 22, 2017 22:22

## 2017-12-22 NOTE — RADRPT
EXAM DATE/TIME:  12/22/2017 22:19 

 

[facility] COMPARISON:     

No previous studies available for comparison.

 

 

INDICATIONS :     

Dizziness, fall.

                      

 

RADIATION DOSE:     

56.35 CTDIvol (mGy) 

 

 

 

MEDICAL HISTORY :     

Cardiovascular disease.  

 

SURGICAL HISTORY :      

None. 

 

ENCOUNTER:      

Initial

 

ACUITY:      

1 day

 

PAIN SCALE:      

Non-responsive

 

LOCATION:        

cranial 

 

TECHNIQUE:     

Multiple contiguous axial images were obtained of the head.  Using automated exposure control and adj
ustment of the mA and/or kV according to patient size, radiation dose was kept as low as reasonably a
chievable to obtain optimal diagnostic quality images.   DICOM format image data is available electro
nically for review and comparison.  

 

FINDINGS:     

 

CEREBRUM:     

The ventricles are normal for age.  No evidence of midline shift, mass lesion, hemorrhage or acute in
farction.  No extra-axial fluid collections are seen.

 

POSTERIOR FOSSA:     

The cerebellum and brainstem are intact.  The 4th ventricle is midline.  The cerebellopontine angle i
s unremarkable.

 

EXTRACRANIAL:     

The visualized portion of the orbits is intact.

 

SKULL:     

The calvaria is intact.  No evidence of skull fracture.

 

CONCLUSION:     

1. Negative noncontrast CT brain.

 

 

 

 Rah Dobbins MD on December 22, 2017 at 22:46           

Board Certified Radiologist.

 This report was verified electronically.

## 2017-12-22 NOTE — RADRPT
EXAM DATE/TIME:  12/22/2017 20:06 

 

[facility] COMPARISON:     

CHEST SINGLE AP, June 18, 2015, 20:36.

 

                     

INDICATIONS :     

Shortness of breath.

                     

 

MEDICAL HISTORY :            

Renal calculi. Hypertension. Cerebral palsy.   

 

SURGICAL HISTORY :     

None.   

 

ENCOUNTER:     

Initial                                        

 

ACUITY:     

1 day      

 

PAIN SCORE:     

0/10

 

LOCATION:     

Bilateral chest 

 

FINDINGS:     

A single view of the chest demonstrates the lungs to be symmetrically aerated without evidence of mas
s, infiltrate or effusion.  The cardiomediastinal contours are unremarkable.  Osseous structures are 
intact.

 

CONCLUSION:     The lungs are clear.

 

 

 

 Rah Dobbins MD on December 22, 2017 at 20:27           

Board Certified Radiologist.

 This report was verified electronically.

## 2017-12-23 VITALS
SYSTOLIC BLOOD PRESSURE: 126 MMHG | RESPIRATION RATE: 20 BRPM | TEMPERATURE: 98.6 F | DIASTOLIC BLOOD PRESSURE: 66 MMHG | HEART RATE: 90 BPM | OXYGEN SATURATION: 97 %

## 2017-12-23 VITALS
SYSTOLIC BLOOD PRESSURE: 129 MMHG | DIASTOLIC BLOOD PRESSURE: 72 MMHG | HEART RATE: 81 BPM | OXYGEN SATURATION: 98 % | RESPIRATION RATE: 18 BRPM | TEMPERATURE: 99 F

## 2017-12-23 VITALS
DIASTOLIC BLOOD PRESSURE: 62 MMHG | HEART RATE: 93 BPM | RESPIRATION RATE: 20 BRPM | TEMPERATURE: 99.8 F | OXYGEN SATURATION: 95 % | SYSTOLIC BLOOD PRESSURE: 140 MMHG

## 2017-12-23 VITALS — HEART RATE: 84 BPM

## 2017-12-23 VITALS
TEMPERATURE: 99.8 F | DIASTOLIC BLOOD PRESSURE: 59 MMHG | HEART RATE: 91 BPM | RESPIRATION RATE: 17 BRPM | OXYGEN SATURATION: 94 % | SYSTOLIC BLOOD PRESSURE: 130 MMHG

## 2017-12-23 VITALS
SYSTOLIC BLOOD PRESSURE: 130 MMHG | TEMPERATURE: 97.6 F | HEART RATE: 98 BPM | OXYGEN SATURATION: 96 % | DIASTOLIC BLOOD PRESSURE: 62 MMHG | RESPIRATION RATE: 20 BRPM

## 2017-12-23 VITALS — HEART RATE: 96 BPM

## 2017-12-23 VITALS — HEART RATE: 86 BPM

## 2017-12-23 LAB
ALBUMIN SERPL-MCNC: 2.5 GM/DL (ref 3.4–5)
ALP SERPL-CCNC: 79 U/L (ref 45–117)
ALT SERPL-CCNC: 44 U/L (ref 12–78)
AST SERPL-CCNC: 84 U/L (ref 15–37)
BASOPHILS # BLD AUTO: 0 TH/MM3 (ref 0–0.2)
BASOPHILS NFR BLD: 0.1 % (ref 0–2)
BILIRUB SERPL-MCNC: 1.1 MG/DL (ref 0.2–1)
BUN SERPL-MCNC: 14 MG/DL (ref 7–18)
CALCIUM SERPL-MCNC: 8.1 MG/DL (ref 8.5–10.1)
CHLORIDE SERPL-SCNC: 100 MEQ/L (ref 98–107)
CREAT SERPL-MCNC: 1.12 MG/DL (ref 0.6–1.3)
EOSINOPHIL # BLD: 0 TH/MM3 (ref 0–0.4)
EOSINOPHIL NFR BLD: 0.1 % (ref 0–4)
ERYTHROCYTE [DISTWIDTH] IN BLOOD BY AUTOMATED COUNT: 12.8 % (ref 11.6–17.2)
GFR SERPLBLD BASED ON 1.73 SQ M-ARVRAT: 66 ML/MIN (ref 89–?)
GLUCOSE SERPL-MCNC: 71 MG/DL (ref 74–106)
HCO3 BLD-SCNC: 25.8 MEQ/L (ref 21–32)
HCT VFR BLD CALC: 40.9 % (ref 39–51)
HGB BLD-MCNC: 14.3 GM/DL (ref 13–17)
LYMPHOCYTES # BLD AUTO: 1.3 TH/MM3 (ref 1–4.8)
LYMPHOCYTES NFR BLD AUTO: 6.5 % (ref 9–44)
MCH RBC QN AUTO: 31.9 PG (ref 27–34)
MCHC RBC AUTO-ENTMCNC: 34.9 % (ref 32–36)
MCV RBC AUTO: 91.3 FL (ref 80–100)
MONOCYTE #: 0.9 TH/MM3 (ref 0–0.9)
MONOCYTES NFR BLD: 4.6 % (ref 0–8)
NEUTROPHILS # BLD AUTO: 17.5 TH/MM3 (ref 1.8–7.7)
NEUTROPHILS NFR BLD AUTO: 88.7 % (ref 16–70)
PLATELET # BLD: 190 TH/MM3 (ref 150–450)
PMV BLD AUTO: 7.4 FL (ref 7–11)
PROT SERPL-MCNC: 6.9 GM/DL (ref 6.4–8.2)
RBC # BLD AUTO: 4.48 MIL/MM3 (ref 4.5–5.9)
SODIUM SERPL-SCNC: 134 MEQ/L (ref 136–145)
WBC # BLD AUTO: 19.8 TH/MM3 (ref 4–11)

## 2017-12-23 RX ADMIN — SODIUM CHLORIDE AND POTASSIUM CHLORIDE SCH MLS/HR: 9; 1.49 INJECTION, SOLUTION INTRAVENOUS at 14:32

## 2017-12-23 RX ADMIN — AZTREONAM SCH MLS/HR: 1 INJECTION, POWDER, LYOPHILIZED, FOR SOLUTION INTRAMUSCULAR; INTRAVENOUS at 13:10

## 2017-12-23 RX ADMIN — STANDARDIZED SENNA CONCENTRATE AND DOCUSATE SODIUM SCH TAB: 8.6; 5 TABLET, FILM COATED ORAL at 09:09

## 2017-12-23 RX ADMIN — HYDROCODONE BITARTRATE AND ACETAMINOPHEN PRN TAB: 10; 325 TABLET ORAL at 01:09

## 2017-12-23 RX ADMIN — Medication SCH ML: at 09:09

## 2017-12-23 RX ADMIN — AZTREONAM SCH MLS/HR: 1 INJECTION, POWDER, LYOPHILIZED, FOR SOLUTION INTRAMUSCULAR; INTRAVENOUS at 06:11

## 2017-12-23 RX ADMIN — AZTREONAM SCH MLS/HR: 1 INJECTION, POWDER, LYOPHILIZED, FOR SOLUTION INTRAMUSCULAR; INTRAVENOUS at 22:50

## 2017-12-23 RX ADMIN — HYDROCODONE BITARTRATE AND ACETAMINOPHEN PRN TAB: 10; 325 TABLET ORAL at 09:09

## 2017-12-23 RX ADMIN — HEPARIN SODIUM SCH UNITS: 10000 INJECTION, SOLUTION INTRAVENOUS; SUBCUTANEOUS at 22:49

## 2017-12-23 RX ADMIN — AZTREONAM SCH MLS/HR: 1 INJECTION, POWDER, LYOPHILIZED, FOR SOLUTION INTRAMUSCULAR; INTRAVENOUS at 00:53

## 2017-12-23 RX ADMIN — Medication SCH ML: at 22:50

## 2017-12-23 RX ADMIN — HEPARIN SODIUM SCH UNITS: 10000 INJECTION, SOLUTION INTRAVENOUS; SUBCUTANEOUS at 09:09

## 2017-12-23 RX ADMIN — ZIPRASIDONE HYDROCHLORIDE SCH MG: 60 CAPSULE ORAL at 22:49

## 2017-12-23 RX ADMIN — STANDARDIZED SENNA CONCENTRATE AND DOCUSATE SODIUM SCH TAB: 8.6; 5 TABLET, FILM COATED ORAL at 22:49

## 2017-12-23 NOTE — HHI.PR
Subjective


Remarks


Follow-up cellulitis and sepsis.  States his dizziness is improving but 

complains of weakness.  Discussed with RN





Objective


Vitals





Vital Signs








  Date Time  Temp Pulse Resp B/P (MAP) Pulse Ox O2 Delivery O2 Flow Rate FiO2


 


12/23/17 12:07 99.8 93 20 129/62 (84) 95   





    140/75 (96)    


 


12/23/17 08:09 97.6 98 20 130/62 (84) 96   


 


12/23/17 04:00  81      


 


12/23/17 04:00 99.0 83 18 129/72 (91) 98   


 


12/23/17 00:00  84      


 


12/22/17 23:36        


 


12/22/17 22:30 98.4 84 18 133/66 (88) 97   


 


12/22/17 21:54  88 20 114/66 (82) 95 Room Air  


 


12/22/17 19:00   18  95 Room Air  


 


12/22/17 18:31  104 18  95 Room Air  


 


12/22/17 18:31 101.8 70 18 142/68 (92) 95 Room Air  


 


12/22/17 18:27 101.8 103 18 142/68 (92) 95   














I/O      


 


 12/22/17 12/22/17 12/22/17 12/23/17 12/23/17 12/23/17





 07:00 15:00 23:00 07:00 15:00 23:00


 


Intake Total    200 ml  


 


Output Total    800 ml  


 


Balance    -600 ml  


 


      


 


Intake IV Total    200 ml  


 


Output Urine Total    800 ml  








Result Diagram:  


12/23/17 0759                                                                  

              12/23/17 0759





Imaging





Last Impressions








Chest X-Ray 12/22/17 1959 Signed





Impressions: 





 Service Date/Time:  Friday, December 22, 2017 20:06 - CONCLUSION: The lungs 

are 





 clear.     Rah Dobbins MD 


 


Head CT 12/22/17 0000 Signed





Impressions: 





 Service Date/Time:  Friday, December 22, 2017 22:19 - CONCLUSION:  1. Negative 





 noncontrast CT brain.     Rah Dobbins MD 








Objective Remarks


GENERAL: Middle-aged white male in no acute distress.


HEENT: PERRLA, EOMI. No scleral icterus or conjunctival pallor. No lid lag or 

facial droop.  


CARDIOVASCULAR: Regular rate and rhythm.  No obvious murmurs to auscultation. 

No chest tenderness to palpation. 


RESPIRATORY: No obvious rhonchi or wheezing. Clear to auscultation. Breath 

sounds equal bilaterally. 


GASTROINTESTINAL: Abdomen soft, non-tender, nondistended. BS normal. 


MUSCULOSKELETAL: Extremities without clubbing, cyanosis, or edema. No obvious 

deformities.  Bilateral lower extremity erythema with some warmth, multiple 

wounds noted.


NEUROLOGICAL: Awake, alert. No focal neurologic deficits. Moving both upper and 

lower extremities spontaneously.


Procedures


None





A/P


Problem List:  


(1) Sepsis


ICD Code:  A41.9 - Sepsis, unspecified organism


(2) Cellulitis


ICD Code:  L03.90 - Cellulitis, unspecified


(3) Renal insufficiency


ICD Code:  N28.9 - Disorder of kidney and ureter, unspecified


(4) Cerebral palsy


ICD Code:  G80.9 - Cerebral palsy, unspecified


(5) Fall


ICD Code:  W19.XXXA - Unspecified fall, initial encounter


Assessment and Plan


1.  Sever sepsis with cellulitis of bilateral lower extremities worse on the 

left.  Continue broad-spectrum IV antibiotics with vancomycin and aztreonam.  

Follow-up blood culture negative to date


2.  Elevated AST likely secondary to sepsis versus rhabdomyolysis.  Monitor 


3.  BRIGITTE with hypokalemia.  Pulling on IV hydration.  Replace potassium check 

magnesium level and treat accordingly.  Avoid nephrotoxins


4.  Cerebral Palsy:  At baseline.  Patient with mild developmental delay.


5.  Fall:  s/p mechanical trip and fall, reports multiple falls in last few 

days w/ associated dizziness, likely secondary to acute infection/dehydration.  

CT Head w/ no acute findings, images reviewed by me.  No other injuries noted. 

PT eval


6.  Rhabdomyolysis status post fall.  Continue IV hydration.  


DVT Prophylaxis:  Heparin sq


Discharge Planning


Home health care versus rehabilitation











Isma Robles MD Dec 23, 2017 14:20

## 2017-12-24 VITALS
SYSTOLIC BLOOD PRESSURE: 134 MMHG | HEART RATE: 89 BPM | RESPIRATION RATE: 18 BRPM | TEMPERATURE: 97.5 F | OXYGEN SATURATION: 95 % | DIASTOLIC BLOOD PRESSURE: 70 MMHG

## 2017-12-24 VITALS
TEMPERATURE: 98.6 F | RESPIRATION RATE: 18 BRPM | OXYGEN SATURATION: 95 % | SYSTOLIC BLOOD PRESSURE: 138 MMHG | DIASTOLIC BLOOD PRESSURE: 66 MMHG | HEART RATE: 88 BPM

## 2017-12-24 VITALS
OXYGEN SATURATION: 97 % | RESPIRATION RATE: 20 BRPM | SYSTOLIC BLOOD PRESSURE: 147 MMHG | TEMPERATURE: 98.8 F | HEART RATE: 81 BPM | DIASTOLIC BLOOD PRESSURE: 80 MMHG

## 2017-12-24 VITALS
OXYGEN SATURATION: 93 % | DIASTOLIC BLOOD PRESSURE: 75 MMHG | HEART RATE: 81 BPM | RESPIRATION RATE: 20 BRPM | SYSTOLIC BLOOD PRESSURE: 144 MMHG | TEMPERATURE: 98.7 F

## 2017-12-24 VITALS
RESPIRATION RATE: 18 BRPM | SYSTOLIC BLOOD PRESSURE: 148 MMHG | HEART RATE: 80 BPM | DIASTOLIC BLOOD PRESSURE: 76 MMHG | OXYGEN SATURATION: 96 % | TEMPERATURE: 98 F

## 2017-12-24 VITALS — HEART RATE: 87 BPM

## 2017-12-24 LAB
ALBUMIN SERPL-MCNC: 2.1 GM/DL (ref 3.4–5)
ALP SERPL-CCNC: 75 U/L (ref 45–117)
ALT SERPL-CCNC: 41 U/L (ref 12–78)
AST SERPL-CCNC: 64 U/L (ref 15–37)
BASOPHILS # BLD AUTO: 0 TH/MM3 (ref 0–0.2)
BASOPHILS NFR BLD: 0.2 % (ref 0–2)
BILIRUB SERPL-MCNC: 0.6 MG/DL (ref 0.2–1)
BUN SERPL-MCNC: 12 MG/DL (ref 7–18)
CALCIUM SERPL-MCNC: 7.6 MG/DL (ref 8.5–10.1)
CHLORIDE SERPL-SCNC: 103 MEQ/L (ref 98–107)
CREAT SERPL-MCNC: 0.78 MG/DL (ref 0.6–1.3)
EOSINOPHIL # BLD: 0 TH/MM3 (ref 0–0.4)
EOSINOPHIL NFR BLD: 0.1 % (ref 0–4)
ERYTHROCYTE [DISTWIDTH] IN BLOOD BY AUTOMATED COUNT: 13 % (ref 11.6–17.2)
GFR SERPLBLD BASED ON 1.73 SQ M-ARVRAT: 101 ML/MIN (ref 89–?)
GLUCOSE SERPL-MCNC: 103 MG/DL (ref 74–106)
HCO3 BLD-SCNC: 25.1 MEQ/L (ref 21–32)
HCT VFR BLD CALC: 37.6 % (ref 39–51)
HGB BLD-MCNC: 13.1 GM/DL (ref 13–17)
LYMPHOCYTES # BLD AUTO: 1 TH/MM3 (ref 1–4.8)
LYMPHOCYTES NFR BLD AUTO: 7.2 % (ref 9–44)
MAGNESIUM SERPL-MCNC: 2 MG/DL (ref 1.5–2.5)
MCH RBC QN AUTO: 31.7 PG (ref 27–34)
MCHC RBC AUTO-ENTMCNC: 34.9 % (ref 32–36)
MCV RBC AUTO: 90.8 FL (ref 80–100)
MONOCYTE #: 1 TH/MM3 (ref 0–0.9)
MONOCYTES NFR BLD: 6.9 % (ref 0–8)
NEUTROPHILS # BLD AUTO: 12.2 TH/MM3 (ref 1.8–7.7)
NEUTROPHILS NFR BLD AUTO: 85.6 % (ref 16–70)
PLATELET # BLD: 174 TH/MM3 (ref 150–450)
PMV BLD AUTO: 7.7 FL (ref 7–11)
PROT SERPL-MCNC: 6.1 GM/DL (ref 6.4–8.2)
RANDOM VANCOMYCIN: 3 COMMENT
RBC # BLD AUTO: 4.14 MIL/MM3 (ref 4.5–5.9)
SODIUM SERPL-SCNC: 135 MEQ/L (ref 136–145)
WBC # BLD AUTO: 14.2 TH/MM3 (ref 4–11)

## 2017-12-24 RX ADMIN — ZIPRASIDONE HYDROCHLORIDE SCH MG: 60 CAPSULE ORAL at 21:16

## 2017-12-24 RX ADMIN — Medication SCH ML: at 21:00

## 2017-12-24 RX ADMIN — HEPARIN SODIUM SCH UNITS: 10000 INJECTION, SOLUTION INTRAVENOUS; SUBCUTANEOUS at 09:03

## 2017-12-24 RX ADMIN — SODIUM CHLORIDE AND POTASSIUM CHLORIDE SCH MLS/HR: 9; 1.49 INJECTION, SOLUTION INTRAVENOUS at 20:15

## 2017-12-24 RX ADMIN — AZTREONAM SCH MLS/HR: 1 INJECTION, POWDER, LYOPHILIZED, FOR SOLUTION INTRAMUSCULAR; INTRAVENOUS at 14:52

## 2017-12-24 RX ADMIN — ONDANSETRON PRN MG: 2 INJECTION, SOLUTION INTRAMUSCULAR; INTRAVENOUS at 08:01

## 2017-12-24 RX ADMIN — HEPARIN SODIUM SCH UNITS: 10000 INJECTION, SOLUTION INTRAVENOUS; SUBCUTANEOUS at 21:15

## 2017-12-24 RX ADMIN — SODIUM CHLORIDE AND POTASSIUM CHLORIDE SCH MLS/HR: 9; 1.49 INJECTION, SOLUTION INTRAVENOUS at 00:31

## 2017-12-24 RX ADMIN — AZTREONAM SCH MLS/HR: 1 INJECTION, POWDER, LYOPHILIZED, FOR SOLUTION INTRAMUSCULAR; INTRAVENOUS at 06:07

## 2017-12-24 RX ADMIN — ONDANSETRON PRN MG: 2 INJECTION, SOLUTION INTRAMUSCULAR; INTRAVENOUS at 17:24

## 2017-12-24 RX ADMIN — AZTREONAM SCH MLS/HR: 1 INJECTION, POWDER, LYOPHILIZED, FOR SOLUTION INTRAMUSCULAR; INTRAVENOUS at 23:18

## 2017-12-24 RX ADMIN — STANDARDIZED SENNA CONCENTRATE AND DOCUSATE SODIUM SCH TAB: 8.6; 5 TABLET, FILM COATED ORAL at 09:03

## 2017-12-24 RX ADMIN — SODIUM CHLORIDE AND POTASSIUM CHLORIDE SCH MLS/HR: 9; 1.49 INJECTION, SOLUTION INTRAVENOUS at 14:53

## 2017-12-24 RX ADMIN — STANDARDIZED SENNA CONCENTRATE AND DOCUSATE SODIUM SCH TAB: 8.6; 5 TABLET, FILM COATED ORAL at 21:00

## 2017-12-24 RX ADMIN — Medication SCH ML: at 08:03

## 2017-12-24 NOTE — HHI.FF
Face to Face Verification


Diagnosis:  


(1) Sepsis


(2) Cellulitis


Physical Therapy


Order:  Evaluate and Treat, Improve ambulation, Strength and gait training





Home Health Nursing








Order: Medical education





 Signs/symptoms of disease process





 Medication education-adverse effect





 Wound care and dressing changes





 Nursing assessment with vital signs

















I have seen patient Lizandro Cuellar on 12/24/17. My clinical findings 

support the need for the requested home health care services because:








 Ltd mobility - disease progression














I certify that my clinical findings support that this patient is homebound 

because:








 Unsafe to leave home unassisted





 Need for psychosocial assistance

















Isma Robles MD Dec 24, 2017 07:47

## 2017-12-24 NOTE — HHI.PR
Subjective


Remarks


Follow-up sepsis, cellulitis and rhabdomyolysis.  Vomited 2 earlier today.  

Discussed with RN





Objective


Vitals





Vital Signs








  Date Time  Temp Pulse Resp B/P (MAP) Pulse Ox O2 Delivery O2 Flow Rate FiO2


 


12/24/17 12:00 98.8 81 20 147/80 (102) 97   


 


12/24/17 08:00 98.7 84 20 144/75 (98) 93   


 


12/24/17 08:00      Room Air  


 


12/24/17 08:00  81      


 


12/24/17 05:34  87      


 


12/24/17 04:00 97.5 89 18 134/70 (91) 95   


 


12/24/17 00:00  88      


 


12/24/17 00:00 98.6 82 18 138/66 (90) 95   


 


12/23/17 20:04  86      


 


12/23/17 20:00 99.8 91 17 130/59 (82) 94   


 


12/23/17 20:00      Room Air  


 


12/23/17 16:05 98.6 90 20 120/63 (82) 97   





    126/66 (86)    














I/O      


 


 12/23/17 12/23/17 12/23/17 12/24/17 12/24/17 12/24/17





 07:00 15:00 23:00 07:00 15:00 23:00


 


Intake Total 200 ml  480 ml 1460 ml  


 


Output Total 800 ml 150 ml 450 ml 800 ml 325 ml 


 


Balance -600 ml -150 ml 30 ml 660 ml -325 ml 


 


      


 


Intake Oral   480 ml 360 ml  


 


IV Total 200 ml   1100 ml  


 


Output Urine Total 800 ml 150 ml 450 ml 800 ml 325 ml 


 


# Bowel Movements   0 0  








Result Diagram:  


12/24/17 0740                                                                  

              12/24/17 0740





Imaging





Last Impressions








Chest X-Ray 12/22/17 1959 Signed





Impressions: 





 Service Date/Time:  Friday, December 22, 2017 20:06 - CONCLUSION: The lungs 

are 





 clear.     Rah Dobbins MD 


 


Head CT 12/22/17 0000 Signed





Impressions: 





 Service Date/Time:  Friday, December 22, 2017 22:19 - CONCLUSION:  1. Negative 





 noncontrast CT brain.     Rah Dobbins MD 








Objective Remarks


GENERAL: Middle-aged white male in no acute distress.


HEENT: PERRLA, EOMI. No scleral icterus or conjunctival pallor. No lid lag or 

facial droop.  


CARDIOVASCULAR: Regular rate and rhythm.  No obvious murmurs to auscultation. 

No chest tenderness to palpation. 


RESPIRATORY: No obvious rhonchi or wheezing. Clear to auscultation. Breath 

sounds equal bilaterally. 


GASTROINTESTINAL: Abdomen soft, non-tender, nondistended. BS normal. 


MUSCULOSKELETAL: Extremities without clubbing, cyanosis, or edema. No obvious 

deformities.  Bilateral lower extremity erythema with some warmth particularly 

left leg, multiple wounds noted.


NEUROLOGICAL: Awake, alert. No focal neurologic deficits. Moving both upper and 

lower extremities spontaneously.


Procedures


None





A/P


Problem List:  


(1) Sepsis


ICD Code:  A41.9 - Sepsis, unspecified organism


(2) Cellulitis


ICD Code:  L03.90 - Cellulitis, unspecified


(3) Renal insufficiency


ICD Code:  N28.9 - Disorder of kidney and ureter, unspecified


(4) Cerebral palsy


ICD Code:  G80.9 - Cerebral palsy, unspecified


(5) Fall


ICD Code:  W19.XXXA - Unspecified fall, initial encounter


Assessment and Plan


1.  Severe sepsis with cellulitis of bilateral lower extremities worse on the 

left.  One aerobic bottle with gram-positive cocci the other with coag negative 

and strep. Continue broad-spectrum IV antibiotics with vancomycin and 

aztreonam.  Follow-up blood culture final results and consider ID consult


2.  Elevated AST likely secondary to sepsis versus rhabdomyolysis.  Monitor 


3.  BRIGITTE with hypokalemia.  Improving on IV hydration.  Replace electrolytes 

accordingly.  Avoid nephrotoxins


4.  Cerebral Palsy:  At baseline.  Patient with mild developmental delay.


5.  Fall:  s/p mechanical trip and fall, reports multiple falls in last few 

days w/ associated dizziness, likely secondary to acute infection/dehydration.  

CT Head w/ no acute findings, images reviewed by me.  No other injuries noted. 

PT eval


6.  Rhabdomyolysis status post fall.  Continue IV hydration.  


DVT Prophylaxis:  Heparin sq


Discharge Planning


Home health care versus rehabilitation, not ready for discharge still on IV 

antibiotics











Isma Robles MD Dec 24, 2017 14:17

## 2017-12-24 NOTE — HHI.DCPOC
Discharge Care Plan


Diagnosis:  


(1) Sepsis


(2) Fall


(3) Cellulitis








Your Health Problems Are: Difficulty with ADL





 Exercise Tolerance








Goals to Promote Your Health


* To prevent worsening of your condition and complications


* To maintain your health at the optimal level


Directions to Meet Your Goals


*** Take your medications as prescribed


*** Follow your dietary instruction


*** Follow activity as directed








*** Keep your appointments as scheduled


*** Take your immunizations and boosters as scheduled


*** If your symptoms worsen call your PCP, if no PCP go to Urgent Care Center 

or Emergency Room***


*** Smoking is Dangerous to Your Health. Avoid second hand smoke***


***Call the 24-hour hour crisis hotline for domestic abuse at 1-819.116.9645***











Isma Robles MD Dec 24, 2017 07:45

## 2017-12-25 VITALS
TEMPERATURE: 98.4 F | HEART RATE: 75 BPM | SYSTOLIC BLOOD PRESSURE: 138 MMHG | OXYGEN SATURATION: 92 % | DIASTOLIC BLOOD PRESSURE: 81 MMHG | RESPIRATION RATE: 18 BRPM

## 2017-12-25 VITALS
HEART RATE: 83 BPM | SYSTOLIC BLOOD PRESSURE: 152 MMHG | OXYGEN SATURATION: 97 % | DIASTOLIC BLOOD PRESSURE: 63 MMHG | RESPIRATION RATE: 16 BRPM | TEMPERATURE: 98.2 F

## 2017-12-25 VITALS — HEART RATE: 78 BPM

## 2017-12-25 VITALS
RESPIRATION RATE: 18 BRPM | TEMPERATURE: 97.3 F | HEART RATE: 90 BPM | DIASTOLIC BLOOD PRESSURE: 69 MMHG | SYSTOLIC BLOOD PRESSURE: 131 MMHG | OXYGEN SATURATION: 94 %

## 2017-12-25 VITALS
OXYGEN SATURATION: 95 % | HEART RATE: 84 BPM | TEMPERATURE: 98.7 F | DIASTOLIC BLOOD PRESSURE: 72 MMHG | SYSTOLIC BLOOD PRESSURE: 149 MMHG | RESPIRATION RATE: 20 BRPM

## 2017-12-25 VITALS
TEMPERATURE: 98.4 F | HEART RATE: 84 BPM | DIASTOLIC BLOOD PRESSURE: 71 MMHG | SYSTOLIC BLOOD PRESSURE: 131 MMHG | RESPIRATION RATE: 16 BRPM | OXYGEN SATURATION: 94 %

## 2017-12-25 VITALS
TEMPERATURE: 97.9 F | DIASTOLIC BLOOD PRESSURE: 76 MMHG | HEART RATE: 83 BPM | SYSTOLIC BLOOD PRESSURE: 141 MMHG | RESPIRATION RATE: 18 BRPM | OXYGEN SATURATION: 94 %

## 2017-12-25 VITALS — HEART RATE: 79 BPM

## 2017-12-25 VITALS — HEART RATE: 83 BPM

## 2017-12-25 LAB
BASOPHILS # BLD AUTO: 0.1 TH/MM3 (ref 0–0.2)
BASOPHILS NFR BLD: 0.9 % (ref 0–2)
BUN SERPL-MCNC: 11 MG/DL (ref 7–18)
CALCIUM SERPL-MCNC: 7.9 MG/DL (ref 8.5–10.1)
CHLORIDE SERPL-SCNC: 104 MEQ/L (ref 98–107)
CREAT SERPL-MCNC: 0.66 MG/DL (ref 0.6–1.3)
EOSINOPHIL # BLD: 0 TH/MM3 (ref 0–0.4)
EOSINOPHIL NFR BLD: 0.3 % (ref 0–4)
ERYTHROCYTE [DISTWIDTH] IN BLOOD BY AUTOMATED COUNT: 13.3 % (ref 11.6–17.2)
GFR SERPLBLD BASED ON 1.73 SQ M-ARVRAT: 122 ML/MIN (ref 89–?)
GLUCOSE SERPL-MCNC: 84 MG/DL (ref 74–106)
HCO3 BLD-SCNC: 21.3 MEQ/L (ref 21–32)
HCT VFR BLD CALC: 38.8 % (ref 39–51)
HGB BLD-MCNC: 13.3 GM/DL (ref 13–17)
LYMPHOCYTES # BLD AUTO: 1.2 TH/MM3 (ref 1–4.8)
LYMPHOCYTES NFR BLD AUTO: 12.8 % (ref 9–44)
MAGNESIUM SERPL-MCNC: 2.2 MG/DL (ref 1.5–2.5)
MCH RBC QN AUTO: 31.7 PG (ref 27–34)
MCHC RBC AUTO-ENTMCNC: 34.1 % (ref 32–36)
MCV RBC AUTO: 93 FL (ref 80–100)
MONOCYTE #: 0.7 TH/MM3 (ref 0–0.9)
MONOCYTES NFR BLD: 7.2 % (ref 0–8)
NEUTROPHILS # BLD AUTO: 7.5 TH/MM3 (ref 1.8–7.7)
NEUTROPHILS NFR BLD AUTO: 78.8 % (ref 16–70)
PLATELET # BLD: 186 TH/MM3 (ref 150–450)
PMV BLD AUTO: 7.5 FL (ref 7–11)
RANDOM VANCOMYCIN: 4.4 COMMENT
RBC # BLD AUTO: 4.18 MIL/MM3 (ref 4.5–5.9)
SODIUM SERPL-SCNC: 132 MEQ/L (ref 136–145)
TOXIC GRANULES BLD QL SMEAR: (no result)
WBC # BLD AUTO: 9.5 TH/MM3 (ref 4–11)
WBC TOXIC VACUOLES BLD QL SMEAR: PRESENT

## 2017-12-25 RX ADMIN — Medication SCH ML: at 09:00

## 2017-12-25 RX ADMIN — HEPARIN SODIUM SCH UNITS: 10000 INJECTION, SOLUTION INTRAVENOUS; SUBCUTANEOUS at 12:18

## 2017-12-25 RX ADMIN — AZTREONAM SCH MLS/HR: 1 INJECTION, POWDER, LYOPHILIZED, FOR SOLUTION INTRAMUSCULAR; INTRAVENOUS at 22:06

## 2017-12-25 RX ADMIN — ZIPRASIDONE HYDROCHLORIDE SCH MG: 60 CAPSULE ORAL at 21:00

## 2017-12-25 RX ADMIN — AZTREONAM SCH MLS/HR: 1 INJECTION, POWDER, LYOPHILIZED, FOR SOLUTION INTRAMUSCULAR; INTRAVENOUS at 14:55

## 2017-12-25 RX ADMIN — Medication SCH ML: at 22:03

## 2017-12-25 RX ADMIN — HEPARIN SODIUM SCH UNITS: 10000 INJECTION, SOLUTION INTRAVENOUS; SUBCUTANEOUS at 21:00

## 2017-12-25 RX ADMIN — SODIUM CHLORIDE AND POTASSIUM CHLORIDE SCH MLS/HR: 9; 1.49 INJECTION, SOLUTION INTRAVENOUS at 02:54

## 2017-12-25 RX ADMIN — SODIUM CHLORIDE SCH MLS/HR: 900 INJECTION INTRAVENOUS at 12:18

## 2017-12-25 RX ADMIN — AZTREONAM SCH MLS/HR: 1 INJECTION, POWDER, LYOPHILIZED, FOR SOLUTION INTRAMUSCULAR; INTRAVENOUS at 05:25

## 2017-12-25 RX ADMIN — STANDARDIZED SENNA CONCENTRATE AND DOCUSATE SODIUM SCH TAB: 8.6; 5 TABLET, FILM COATED ORAL at 21:00

## 2017-12-25 RX ADMIN — STANDARDIZED SENNA CONCENTRATE AND DOCUSATE SODIUM SCH TAB: 8.6; 5 TABLET, FILM COATED ORAL at 09:00

## 2017-12-25 NOTE — HHI.PR
Subjective


Remarks


Follow-up nausea, vomiting, sepsis and cellulitis.  Improved nausea and 

vomiting.  Patient requesting to be discharged to [facility].  Discussed with 

RN and Case Management





Objective


Vitals





Vital Signs








  Date Time  Temp Pulse Resp B/P (MAP) Pulse Ox O2 Delivery O2 Flow Rate FiO2


 


12/25/17 12:00      Room Air  


 


12/25/17 12:00 97.3 90 18 131/69 (89) 94   


 


12/25/17 08:00 97.9 84 18 141/76 (97) 94   


 


12/25/17 04:00 98.4 84 16 131/71 (91) 94   


 


12/25/17 04:00      Room Air  


 


12/25/17 04:00  75      


 


12/25/17 00:00  83      


 


12/25/17 00:00 98.2 79 16 152/63 (92) 97   


 


12/25/17 00:00      Room Air  


 


12/24/17 20:00      Room Air  


 


12/24/17 20:00  80      


 


12/24/17 20:00 98.0 81 18 148/76 (100) 96   














I/O      


 


 12/24/17 12/24/17 12/24/17 12/25/17 12/25/17 12/25/17





 07:00 15:00 23:00 07:00 15:00 23:00


 


Intake Total 1460 ml  480 ml 1100 ml  


 


Output Total 800 ml 325 ml  1400 ml  


 


Balance 660 ml -325 ml 480 ml -300 ml  


 


      


 


Intake Oral 360 ml  480 ml   


 


IV Total 1100 ml   1100 ml  


 


Output Urine Total 800 ml 325 ml  1400 ml  


 


# Voids   1   


 


# Bowel Movements 0  2  1 








Result Diagram:  


12/25/17 0720                                                                  

              12/25/17 0720





Imaging





Last Impressions








Chest X-Ray 12/22/17 1959 Signed





Impressions: 





 Service Date/Time:  Friday, December 22, 2017 20:06 - CONCLUSION: The lungs 

are 





 clear.     Rah Dobbins MD 


 


Head CT 12/22/17 0000 Signed





Impressions: 





 Service Date/Time:  Friday, December 22, 2017 22:19 - CONCLUSION:  1. Negative 





 noncontrast CT brain.     Rah Dobbins MD 








Objective Remarks


GENERAL: Middle-aged white male in no acute distress.


HEENT: PERRLA, EOMI. No scleral icterus or conjunctival pallor. No lid lag or 

facial droop.  


CARDIOVASCULAR: Regular rate and rhythm.  No obvious murmurs to auscultation. 

No chest tenderness to palpation. 


RESPIRATORY: No obvious rhonchi or wheezing. Clear to auscultation. Breath 

sounds equal bilaterally. 


GASTROINTESTINAL: Abdomen soft, non-tender, nondistended. BS normal. 


MUSCULOSKELETAL: Extremities without clubbing, cyanosis but with bilateral 

pitting edema. No obvious deformities.  Bilateral lower extremity erythema with 

some warmth particularly left leg, multiple wounds noted.


NEUROLOGICAL: Awake, alert. No focal neurologic deficits. Moving both upper and 

lower extremities spontaneously.


Procedures


None





A/P


Problem List:  


(1) Sepsis


ICD Code:  A41.9 - Sepsis, unspecified organism


(2) Cellulitis


ICD Code:  L03.90 - Cellulitis, unspecified


(3) Renal insufficiency


ICD Code:  N28.9 - Disorder of kidney and ureter, unspecified


(4) Cerebral palsy


ICD Code:  G80.9 - Cerebral palsy, unspecified


(5) Fall


ICD Code:  W19.XXXA - Unspecified fall, initial encounter


Assessment and Plan


1.  Severe sepsis with cellulitis of bilateral lower extremities worse on the 

left.  One aerobic bottle with gram-positive cocci the other with coag negative 

and strep likely secondary to cellulitis. Continue broad-spectrum IV 

antibiotics with vancomycin and aztreonam.  Follow-up blood culture final 

results and consider ID consult


2.  Elevated AST likely secondary to sepsis versus rhabdomyolysis.  Monitor 


3.  BRIGITTE with hypokalemia.  Improved discontinue IV hydration.  Replace 

electrolytes accordingly.  Avoid nephrotoxins


4.  Cerebral Palsy:  At baseline.  Patient with mild developmental delay.


5.  Fall:  s/p mechanical trip and fall, reports multiple falls in last few 

days w/ associated dizziness, likely secondary to acute infection/dehydration.  

CT Head w/ no acute findings, images reviewed by me.  No other injuries noted. 

PT recommended home health care versus rehabilitation patient prefers to be 

discharged to [facility]


6.  Rhabdomyolysis status post fall.  Improved discontinue IV hydration.  


DVT Prophylaxis:  Heparin sq


Discharge Planning


not ready for discharge still on IV antibiotics











Isma Robles MD Dec 25, 2017 15:36

## 2017-12-26 VITALS — HEART RATE: 70 BPM

## 2017-12-26 VITALS
DIASTOLIC BLOOD PRESSURE: 66 MMHG | RESPIRATION RATE: 18 BRPM | TEMPERATURE: 98.6 F | OXYGEN SATURATION: 96 % | SYSTOLIC BLOOD PRESSURE: 136 MMHG | HEART RATE: 74 BPM

## 2017-12-26 VITALS
OXYGEN SATURATION: 94 % | SYSTOLIC BLOOD PRESSURE: 117 MMHG | RESPIRATION RATE: 18 BRPM | HEART RATE: 76 BPM | TEMPERATURE: 97.8 F | DIASTOLIC BLOOD PRESSURE: 69 MMHG

## 2017-12-26 VITALS
DIASTOLIC BLOOD PRESSURE: 64 MMHG | SYSTOLIC BLOOD PRESSURE: 133 MMHG | TEMPERATURE: 98.5 F | OXYGEN SATURATION: 97 % | HEART RATE: 77 BPM | RESPIRATION RATE: 18 BRPM

## 2017-12-26 VITALS
OXYGEN SATURATION: 95 % | TEMPERATURE: 98.3 F | DIASTOLIC BLOOD PRESSURE: 77 MMHG | RESPIRATION RATE: 18 BRPM | SYSTOLIC BLOOD PRESSURE: 154 MMHG | HEART RATE: 74 BPM

## 2017-12-26 VITALS
HEART RATE: 76 BPM | DIASTOLIC BLOOD PRESSURE: 82 MMHG | TEMPERATURE: 98 F | SYSTOLIC BLOOD PRESSURE: 129 MMHG | RESPIRATION RATE: 18 BRPM | OXYGEN SATURATION: 98 %

## 2017-12-26 VITALS
HEART RATE: 77 BPM | SYSTOLIC BLOOD PRESSURE: 141 MMHG | RESPIRATION RATE: 20 BRPM | DIASTOLIC BLOOD PRESSURE: 75 MMHG | OXYGEN SATURATION: 95 % | TEMPERATURE: 97.9 F

## 2017-12-26 VITALS — HEART RATE: 72 BPM

## 2017-12-26 VITALS — HEART RATE: 82 BPM

## 2017-12-26 VITALS — HEART RATE: 75 BPM

## 2017-12-26 VITALS — HEART RATE: 76 BPM

## 2017-12-26 VITALS — HEART RATE: 68 BPM

## 2017-12-26 LAB
BUN SERPL-MCNC: 12 MG/DL (ref 7–18)
CALCIUM SERPL-MCNC: 8 MG/DL (ref 8.5–10.1)
CHLORIDE SERPL-SCNC: 107 MEQ/L (ref 98–107)
CREAT SERPL-MCNC: 0.64 MG/DL (ref 0.6–1.3)
ERYTHROCYTE [DISTWIDTH] IN BLOOD BY AUTOMATED COUNT: 13.2 % (ref 11.6–17.2)
GFR SERPLBLD BASED ON 1.73 SQ M-ARVRAT: 127 ML/MIN (ref 89–?)
GLUCOSE SERPL-MCNC: 122 MG/DL (ref 74–106)
HCO3 BLD-SCNC: 25.5 MEQ/L (ref 21–32)
HCT VFR BLD CALC: 39.1 % (ref 39–51)
HGB BLD-MCNC: 13.2 GM/DL (ref 13–17)
MCH RBC QN AUTO: 31.6 PG (ref 27–34)
MCHC RBC AUTO-ENTMCNC: 33.7 % (ref 32–36)
MCV RBC AUTO: 93.7 FL (ref 80–100)
PLATELET # BLD: 273 TH/MM3 (ref 150–450)
PMV BLD AUTO: 7.5 FL (ref 7–11)
RBC # BLD AUTO: 4.17 MIL/MM3 (ref 4.5–5.9)
SODIUM SERPL-SCNC: 141 MEQ/L (ref 136–145)
WBC # BLD AUTO: 8.3 TH/MM3 (ref 4–11)

## 2017-12-26 RX ADMIN — Medication SCH ML: at 21:06

## 2017-12-26 RX ADMIN — STANDARDIZED SENNA CONCENTRATE AND DOCUSATE SODIUM SCH TAB: 8.6; 5 TABLET, FILM COATED ORAL at 08:53

## 2017-12-26 RX ADMIN — SODIUM CHLORIDE SCH MLS/HR: 900 INJECTION INTRAVENOUS at 15:31

## 2017-12-26 RX ADMIN — LEVOFLOXACIN SCH MLS/HR: 5 INJECTION, SOLUTION INTRAVENOUS at 16:00

## 2017-12-26 RX ADMIN — HEPARIN SODIUM SCH UNITS: 10000 INJECTION, SOLUTION INTRAVENOUS; SUBCUTANEOUS at 08:53

## 2017-12-26 RX ADMIN — AZTREONAM SCH MLS/HR: 1 INJECTION, POWDER, LYOPHILIZED, FOR SOLUTION INTRAMUSCULAR; INTRAVENOUS at 05:43

## 2017-12-26 RX ADMIN — Medication SCH ML: at 08:53

## 2017-12-26 RX ADMIN — ZIPRASIDONE HYDROCHLORIDE SCH MG: 60 CAPSULE ORAL at 21:00

## 2017-12-26 RX ADMIN — STANDARDIZED SENNA CONCENTRATE AND DOCUSATE SODIUM SCH TAB: 8.6; 5 TABLET, FILM COATED ORAL at 21:00

## 2017-12-26 RX ADMIN — AZTREONAM SCH MLS/HR: 1 INJECTION, POWDER, LYOPHILIZED, FOR SOLUTION INTRAMUSCULAR; INTRAVENOUS at 14:30

## 2017-12-26 RX ADMIN — AZTREONAM SCH MLS/HR: 1 INJECTION, POWDER, LYOPHILIZED, FOR SOLUTION INTRAMUSCULAR; INTRAVENOUS at 21:06

## 2017-12-26 RX ADMIN — HEPARIN SODIUM SCH UNITS: 10000 INJECTION, SOLUTION INTRAVENOUS; SUBCUTANEOUS at 21:07

## 2017-12-26 NOTE — HHI.PR
Subjective


Remarks


Denies fevers and chills.


RLE is still erythematous and swollen but with decreased tenderness





Objective


Vitals





Vital Signs








  Date Time  Temp Pulse Resp B/P (MAP) Pulse Ox O2 Delivery O2 Flow Rate FiO2


 


12/26/17 12:00 97.8 76 18 117/69 (85) 94   


 


12/26/17 08:00 98.0 76 18 129/82 (98) 98   


 


12/26/17 04:20 98.6 74 18 136/66 (89) 96   


 


12/26/17 03:49  75      


 


12/26/17 00:00 98.5 77 18 133/64 (87) 97   


 


12/25/17 23:48  79      


 


12/25/17 19:46  78      


 


12/25/17 19:30 98.4 75 18 138/81 (100) 92   


 


12/25/17 19:30      Room Air  


 


12/25/17 18:36  83      


 


12/25/17 16:00 98.7 84 20 149/72 (97) 95   














I/O      


 


 12/25/17 12/25/17 12/25/17 12/26/17 12/26/17 12/26/17





 06:59 14:59 22:59 06:59 14:59 22:59


 


Intake Total 1100 ml  1060 ml 480 ml  


 


Output Total 1400 ml  700 ml 1225 ml  


 


Balance -300 ml  360 ml -745 ml  


 


      


 


Intake Oral   960 ml 480 ml  


 


IV Total 1100 ml  100 ml   


 


Output Urine Total 1400 ml  700 ml 1225 ml  


 


# Voids   1   


 


# Bowel Movements  1 1 0  








Result Diagram:  


12/26/17 1350                                                                  

              12/26/17 1220





Imaging





Last Impressions








Chest X-Ray 12/22/17 1959 Signed





Impressions: 





 Service Date/Time:  Friday, December 22, 2017 20:06 - CONCLUSION: The lungs 

are 





 clear.     Rah Dobbins MD 


 


Head CT 12/22/17 0000 Signed





Impressions: 





 Service Date/Time:  Friday, December 22, 2017 22:19 - CONCLUSION:  1. Negative 





 noncontrast CT brain.     Rah Dobbins MD 








Objective Remarks


AAOx3


CLear lungs BL


S1S2 RRR


abdomen obese, soft, non tender non distended


Left lower extremity is erythematous, mild tender to palpation with edema and 

swelling.


Procedures


None


Medications and IVs





Current Medications








 Medications


  (Trade)  Dose


 Ordered  Sig/Amish


 Route  Start Time


 Stop Time Status Last Admin


 


  (NS Flush)  2 ml  UNSCH  PRN


 IV FLUSH  12/22/17 22:30


     


 


 


  (NS Flush)  2 ml  BID


 IV FLUSH  12/23/17 09:00


    12/26/17 08:53


 


 


  (Zofran Inj)  4 mg  Q6H  PRN


 IVP  12/22/17 22:30


    12/24/17 17:24


 


 


  (Heparin Inj)  5,000 units  Q12H


 SQ  12/23/17 09:00


    12/26/17 08:53


 


 


  (Tylenol)  650 mg  Q6H  PRN


 PO  12/22/17 22:30


     


 


 


  (Norco  5-325 Mg)  1 tab  Q4H  PRN


 PO  12/22/17 22:30


     


 


 


  (Laurie-Colace)  1 tab  BID


 PO  12/23/17 09:00


    12/26/17 08:53


 


 


  (Milk Of


 Magnesia Liq)  30 ml  Q12H  PRN


 PO  12/22/17 22:30


     


 


 


  (Senokot)  17.2 mg  Q12H  PRN


 PO  12/22/17 22:30


     


 


 


  (Dulcolax Supp)  10 mg  DAILY  PRN


 RECTAL  12/22/17 22:30


     


 


 


  (Lactulose Liq)  30 ml  DAILY  PRN


 PO  12/22/17 22:30


     


 


 


  (Norco  Mg)  1 tab  Q4H  PRN


 PO  12/22/17 22:30


    12/23/17 09:09


 


 


 Pharmacy Profile


 Note  0 ml @ 0


 mls/hr  UNSCH


 OTHER  12/23/17 06:00


     


 


 


 Aztreonam 1000 mg/


 Sodium Chloride  100 ml @ 


 200 mls/hr  Q8H


 IV  12/22/17 22:30


    12/26/17 05:43


 


 


  (Wellbutrin)  150 mg  HS


 PO  12/23/17 21:00


    12/24/17 21:15


 


 


  (Geodon)  120 mg  HS


 PO  12/23/17 21:00


    12/24/17 21:16


 


 


 Vancomycin HCl


 2500 mg/Sodium


 Chloride  525 ml @ 


 257.5 mls/


 hr  Q24H


 IV  12/25/17 12:00


    12/25/17 12:18


 


 


 Miscellaneous


 Information  SPECIFIC


 LAB TO BE


 ALBERT...  ONCE  ONCE


 .XX  12/27/17 11:45


 12/27/17 11:46   


 











A/P


Problem List:  


(1) Sepsis


ICD Code:  A41.9 - Sepsis, unspecified organism


(2) Cellulitis


ICD Code:  L03.90 - Cellulitis, unspecified


Status:  Acute


(3) Cerebral palsy


ICD Code:  G80.9 - Cerebral palsy, unspecified


Status:  Chronic


(4) Fall


ICD Code:  W19.XXXA - Unspecified fall, initial encounter


Assessment and Plan


1.  Severe sepsis with cellulitis of bilateral lower extremities worse on the 

left.  One aerobic bottle with gram-positive cocci the other with coag negative 

and strep likely secondary to cellulitis. Continue broad-spectrum IV 

antibiotics with vancomycin and aztreonam.  Follow-up blood culture final 

results and consider ID consult


12/26 Suspect blood culture findings likely contaminants.  Continue IV 

antibiotics.  Patient currently on IV vancomycin and IV Aztreonam.  Discontinue 

IV vancomycin, will start on IV Levaquin and continue aztreonam. Will start on 

Lactobacilus Acidophilus to prevent C diff.





2.  Elevated AST likely secondary to sepsis and rhabdomyolisis. 


12/26 CK on admission was 3276, treated with IV fluids and trending down.  AST 

initially trended up from 58-84, now trending down to 64.  Check hepatitis 

profile if not previously done.





3.  BRIGITTE with hypokalemia.  Improved discontinue IV hydration.  Replace 

electrolytes accordingly.  Avoid nephrotoxins


12/26 soft after IV fluid administration.  AKA likely due to prerenal azotemia 

due to the hydration.  IV fluids discontinued.


Hypokalemia resolved after repletion.  Continue to monitor BMP.





4.  Cerebral Palsy:  At baseline.  Patient with mild developmental delay.





5.  Fall:  s/p mechanical trip and fall, reports multiple falls in last few 

days w/ associated dizziness, likely secondary to acute infection/dehydration.  

CT Head w/ no acute findings, images reviewed by me.  No other injuries noted. 

PT recommended home health care versus rehabilitation patient prefers to be 

discharged to [facility].





6.  Rhabdomyolysis status post fall.  Improved after IV fluid administration.





DVT Prophylaxis:  Heparin sq


Discharge Planning


Continue to monitor in the medical floor.  Anticipate discharge in 1-2 days 

pending clinical improvement of cellulitis.





Problem Qualifiers





(1) Sepsis:  


Qualified Codes:  A41.9 - Sepsis, unspecified organism


(2) Cellulitis:  


Qualified Codes:  L03.116 - Cellulitis of left lower limb


(3) Cerebral palsy:  


Qualified Codes:  G80.9 - Cerebral palsy, unspecified


(4) Fall:  


Qualified Codes:  W19.XXXD - Unspecified fall, subsequent encounter








Aki Ying MD Dec 26, 2017 15:12

## 2017-12-27 VITALS
TEMPERATURE: 97.8 F | DIASTOLIC BLOOD PRESSURE: 75 MMHG | SYSTOLIC BLOOD PRESSURE: 136 MMHG | OXYGEN SATURATION: 95 % | RESPIRATION RATE: 18 BRPM | HEART RATE: 73 BPM

## 2017-12-27 VITALS
RESPIRATION RATE: 18 BRPM | TEMPERATURE: 98.3 F | DIASTOLIC BLOOD PRESSURE: 70 MMHG | OXYGEN SATURATION: 95 % | HEART RATE: 75 BPM | SYSTOLIC BLOOD PRESSURE: 133 MMHG

## 2017-12-27 VITALS
SYSTOLIC BLOOD PRESSURE: 136 MMHG | TEMPERATURE: 98.3 F | RESPIRATION RATE: 18 BRPM | DIASTOLIC BLOOD PRESSURE: 66 MMHG | OXYGEN SATURATION: 96 % | HEART RATE: 74 BPM

## 2017-12-27 VITALS
DIASTOLIC BLOOD PRESSURE: 76 MMHG | TEMPERATURE: 98 F | HEART RATE: 88 BPM | RESPIRATION RATE: 20 BRPM | SYSTOLIC BLOOD PRESSURE: 139 MMHG | OXYGEN SATURATION: 95 %

## 2017-12-27 VITALS
TEMPERATURE: 98.2 F | HEART RATE: 70 BPM | DIASTOLIC BLOOD PRESSURE: 82 MMHG | OXYGEN SATURATION: 97 % | SYSTOLIC BLOOD PRESSURE: 150 MMHG | RESPIRATION RATE: 18 BRPM

## 2017-12-27 VITALS — HEART RATE: 82 BPM

## 2017-12-27 VITALS
TEMPERATURE: 98.4 F | DIASTOLIC BLOOD PRESSURE: 66 MMHG | SYSTOLIC BLOOD PRESSURE: 130 MMHG | OXYGEN SATURATION: 94 % | RESPIRATION RATE: 18 BRPM | HEART RATE: 75 BPM

## 2017-12-27 VITALS — HEART RATE: 69 BPM

## 2017-12-27 VITALS — HEART RATE: 79 BPM

## 2017-12-27 LAB
ALBUMIN SERPL-MCNC: 2 GM/DL (ref 3.4–5)
ALP SERPL-CCNC: 74 U/L (ref 45–117)
ALT SERPL-CCNC: 62 U/L (ref 12–78)
AST SERPL-CCNC: 71 U/L (ref 15–37)
BASOPHILS # BLD AUTO: 0 TH/MM3 (ref 0–0.2)
BASOPHILS NFR BLD AUTO: 1 % (ref 0–2)
BASOPHILS NFR BLD: 0.2 % (ref 0–2)
BILIRUB SERPL-MCNC: 0.4 MG/DL (ref 0.2–1)
BUN SERPL-MCNC: 10 MG/DL (ref 7–18)
CALCIUM SERPL-MCNC: 8.5 MG/DL (ref 8.5–10.1)
CHLORIDE SERPL-SCNC: 102 MEQ/L (ref 98–107)
CREAT SERPL-MCNC: 0.74 MG/DL (ref 0.6–1.3)
EOSINOPHIL # BLD: 0.1 TH/MM3 (ref 0–0.4)
EOSINOPHIL NFR BLD: 0.6 % (ref 0–4)
ERYTHROCYTE [DISTWIDTH] IN BLOOD BY AUTOMATED COUNT: 13.6 % (ref 11.6–17.2)
GFR SERPLBLD BASED ON 1.73 SQ M-ARVRAT: 107 ML/MIN (ref 89–?)
GLUCOSE SERPL-MCNC: 88 MG/DL (ref 74–106)
HCO3 BLD-SCNC: 28.4 MEQ/L (ref 21–32)
HCT VFR BLD CALC: 40.7 % (ref 39–51)
HGB BLD-MCNC: 13.8 GM/DL (ref 13–17)
LYMPHOCYTES # BLD AUTO: 2.6 TH/MM3 (ref 1–4.8)
LYMPHOCYTES NFR BLD AUTO: 26.7 % (ref 9–44)
LYMPHOCYTES: 15 % (ref 9–44)
MAGNESIUM SERPL-MCNC: 2.1 MG/DL (ref 1.5–2.5)
MCH RBC QN AUTO: 31.5 PG (ref 27–34)
MCHC RBC AUTO-ENTMCNC: 34 % (ref 32–36)
MCV RBC AUTO: 92.8 FL (ref 80–100)
MONOCYTE #: 0.6 TH/MM3 (ref 0–0.9)
MONOCYTES NFR BLD: 6.7 % (ref 0–8)
MONOCYTES: 1 % (ref 0–8)
NEUTROPHILS # BLD AUTO: 6.3 TH/MM3 (ref 1.8–7.7)
NEUTROPHILS NFR BLD AUTO: 65.8 % (ref 16–70)
NEUTS BAND # BLD MANUAL: 8 TH/MM3 (ref 1.8–7.7)
NEUTS BAND NFR BLD: 2 % (ref 0–6)
NEUTS SEG NFR BLD MANUAL: 80 % (ref 16–70)
PHOSPHATE SERPL-MCNC: 2.9 MG/DL (ref 2.5–4.9)
PLATELET # BLD: 285 TH/MM3 (ref 150–450)
PMV BLD AUTO: 7.7 FL (ref 7–11)
PROMYELOCYTES NFR BLD: 1 % (ref 0–0)
PROT SERPL-MCNC: 6.9 GM/DL (ref 6.4–8.2)
RBC # BLD AUTO: 4.39 MIL/MM3 (ref 4.5–5.9)
SODIUM SERPL-SCNC: 138 MEQ/L (ref 136–145)
TOXIC GRANULES BLD QL SMEAR: (no result)
WBC # BLD AUTO: 9.6 TH/MM3 (ref 4–11)

## 2017-12-27 RX ADMIN — AZTREONAM SCH MLS/HR: 1 INJECTION, POWDER, LYOPHILIZED, FOR SOLUTION INTRAMUSCULAR; INTRAVENOUS at 05:37

## 2017-12-27 RX ADMIN — STANDARDIZED SENNA CONCENTRATE AND DOCUSATE SODIUM SCH TAB: 8.6; 5 TABLET, FILM COATED ORAL at 21:00

## 2017-12-27 RX ADMIN — HEPARIN SODIUM SCH UNITS: 10000 INJECTION, SOLUTION INTRAVENOUS; SUBCUTANEOUS at 09:08

## 2017-12-27 RX ADMIN — AZTREONAM SCH MLS/HR: 1 INJECTION, POWDER, LYOPHILIZED, FOR SOLUTION INTRAMUSCULAR; INTRAVENOUS at 15:15

## 2017-12-27 RX ADMIN — HEPARIN SODIUM SCH UNITS: 10000 INJECTION, SOLUTION INTRAVENOUS; SUBCUTANEOUS at 21:38

## 2017-12-27 RX ADMIN — ONDANSETRON PRN MG: 2 INJECTION, SOLUTION INTRAMUSCULAR; INTRAVENOUS at 09:18

## 2017-12-27 RX ADMIN — Medication SCH ML: at 21:39

## 2017-12-27 RX ADMIN — STANDARDIZED SENNA CONCENTRATE AND DOCUSATE SODIUM SCH TAB: 8.6; 5 TABLET, FILM COATED ORAL at 09:00

## 2017-12-27 RX ADMIN — SODIUM CHLORIDE SCH MLS/HR: 900 INJECTION INTRAVENOUS at 18:58

## 2017-12-27 RX ADMIN — Medication SCH ML: at 09:08

## 2017-12-27 RX ADMIN — STANDARDIZED SENNA CONCENTRATE AND DOCUSATE SODIUM SCH TAB: 8.6; 5 TABLET, FILM COATED ORAL at 21:38

## 2017-12-27 RX ADMIN — ZIPRASIDONE HYDROCHLORIDE SCH MG: 60 CAPSULE ORAL at 21:38

## 2017-12-27 RX ADMIN — LEVOFLOXACIN SCH MLS/HR: 5 INJECTION, SOLUTION INTRAVENOUS at 17:38

## 2017-12-27 RX ADMIN — AZTREONAM SCH MLS/HR: 1 INJECTION, POWDER, LYOPHILIZED, FOR SOLUTION INTRAMUSCULAR; INTRAVENOUS at 21:38

## 2017-12-27 NOTE — MB
cc:

ELIZABETH CHRISTIANSON MD

****

 

 

DATE OF CONSULTATION:  12/27/2017

 

REQUESTING PHYSICIAN

Dr. Gonzalez.

 

REASON FOR CONSULTATION:

Left lower extremity cellulitis.

 

HISTORY OF PRESENT ILLNESS

This is a 62-year-old white male who was admitted to the hospital on 12/22/2017

with sepsis and cellulitis of the left lower extremity.  This consultation was

requested because the patient it is felt that the patient was not responding

adequately to current antibiotic treatment.

 

He was noted to have bilateral lower extremity cellulitis and was started on IV

antibiotics after blood cultures were taken.  The blood cultures are no growth.

His white count on admission was 19.8 and he had a temperature of 101.8 degrees

on admission. This prompted antibiotic treatment for sepsis. He received

intravenous vancomycin on 12/23, and it was discontinued on 12/26.  Currently

he is on Levaquin and aztreonam.  The left lower extremity has multiple dried

excoriated lesions as does the left lower extremity and arms.  The patient

notes that he was bitten by bed bugs and subsequently these lesions came up.

His white blood cell count is now normal.  The patient voices no other

complaints.

 

PAST MEDICAL HISTORY

Anxiety.

Depression.

Hyperlipidemia.

Hypertension.

Cerebral palsy.

Bilateral knee replacement.

 

ALLERGIES

Cephalexin.

 

MEDICATIONS

1. Levaquin

2. Aztreonam

3. Geodon.

4. Tylenol.

5. Heparin subcutaneous.

6. Lactulose.

7. Norco 10.

 

SOCIAL HISTORY:

No tobacco, no alcohol.  No illicit drug use.

 

FAMILY HISTORY:

Noncontributory.

 

REVIEW OF SYSTEMS:

Review of systems negative on 10-point review.

 

PHYSICAL EXAMINATION

This is a morbidly obese male who is in no acute distress.  He is awake and

alert and oriented.

Vital signs: Include temperature 97.8, blood pressure 136/75, respiratory rate

18, heart rate 73.

EENT:  Head is atraumatic.  Extraocular movements grossly intact, pupils

reactive to light.  No icterus.  Oropharynx: no lesions.  Neck:  Supple without

adenopathy.

Lungs:  Clear breath sounds bilateral.

Heart:  Regular rate and rhythm without murmurs, rubs or gallops. Abdomen:

Obese, soft, no tenderness appreciated.

Rectal:  Not performed.

Extremities:  Multiple excoriated coin lesions at the arms and legs around the

tibia.  The left leg has confluent erythema which is very beefy red

circumferentially and also involving the left tibia and proximal aspect of the

left foot and around the ankle.  There is a lesser degree of erythema at the 
right

tibia.

 

LABORATORY DATA

WBC 9.6, platelet 285, hemoglobin 13.8, creatinine 0.74, estimated .

 

IMPRESSION

1. Cellulitis of the legs with the left being very much more prominent than the

     right side.  Poor response to prior antibiotic treatment.

2. Allergic to cephalexin.

3. Sepsis which appears under control with treatment.

 

RECOMMENDATIONS

1. Discontinue aztreonam.

2. Begin Unasyn.

3. Discontinue Levaquin.

4. Monitor response to the Unasyn.

5. Monitor for recurrence of fever.

 

Thank you for this consultation.  The patient specifically tells me that he has

no allergy to penicillin.

 

I will monitor the patient's progress with you and make further recommendations

upon follow up.

 

 

 

                              _________________________________

                              Elizabeth Christianson MD FD/ANDREW

D:  12/27/2017/6:01 PM

T:  12/27/2017/9:37 PM

Visit #:  [id number]

Job #:  [id number]

[email or web address]

## 2017-12-27 NOTE — HHI.PR
Subjective


Remarks


Denies fevers or chills.


still has swellin and redness on LLE but denies pain





note incomplete





Objective


Vitals





Vital Signs








  Date Time  Temp Pulse Resp B/P (MAP) Pulse Ox O2 Delivery O2 Flow Rate FiO2


 


12/27/17 12:45  79      


 


12/27/17 12:26      Room Air  


 


12/27/17 12:00 98.3 74 18 136/66 (89) 96   


 


12/27/17 09:00      Room Air  


 


12/27/17 08:00 98.0 72 20 139/76 (97) 95   


 


12/27/17 08:00  88      


 


12/27/17 04:00 98.3 75 18 133/70 (91) 95   


 


12/27/17 03:47  69      


 


12/27/17 00:00 98.2 70 18 150/82 (104) 97   


 


12/26/17 23:49  70      


 


12/26/17 20:00      Room Air  


 


12/26/17 20:00 98.3 74 18 154/77 (102) 95   


 


12/26/17 19:47  68      


 


12/26/17 16:15  76      


 


12/26/17 16:00 97.9 77 20 141/75 (97) 95   














I/O      


 


 12/26/17 12/26/17 12/26/17 12/27/17 12/27/17 12/27/17





 07:00 15:00 23:00 07:00 15:00 23:00


 


Intake Total 480 ml  1250 ml 240 ml  


 


Output Total 1225 ml  200 ml 300 ml  


 


Balance -745 ml  1050 ml -60 ml  


 


      


 


Intake Oral 480 ml  600 ml 240 ml  


 


IV Total   650 ml   


 


Output Urine Total 1225 ml  200 ml 300 ml  


 


# Voids   3 5  


 


# Bowel Movements 0  3 1  








Result Diagram:  


12/27/17 0710                                                                  

              12/27/17 0710





Imaging





Last Impressions








Chest X-Ray 12/22/17 1959 Signed





Impressions: 





 Service Date/Time:  Friday, December 22, 2017 20:06 - CONCLUSION: The lungs 

are 





 clear.     Rah Dobbins MD 


 


Head CT 12/22/17 0000 Signed





Impressions: 





 Service Date/Time:  Friday, December 22, 2017 22:19 - CONCLUSION:  1. Negative 





 noncontrast CT brain.     Rah Dobbins MD 








Objective Remarks


AAOx3


CLear lungs BL


S1S2 RRR


abdomen obese, soft, non tender non distended


Left lower extremity is erythematous, mild tender to palpation with edema and 

swelling.


Procedures


None


Medications and IVs





Current Medications








 Medications


  (Trade)  Dose


 Ordered  Sig/Amish


 Route  Start Time


 Stop Time Status Last Admin


 


  (NS Flush)  2 ml  UNSCH  PRN


 IV FLUSH  12/22/17 22:30


     


 


 


  (NS Flush)  2 ml  BID


 IV FLUSH  12/23/17 09:00


    12/27/17 09:08


 


 


  (Zofran Inj)  4 mg  Q6H  PRN


 IVP  12/22/17 22:30


    12/27/17 09:18


 


 


  (Heparin Inj)  5,000 units  Q12H


 SQ  12/23/17 09:00


    12/27/17 09:08


 


 


  (Tylenol)  650 mg  Q6H  PRN


 PO  12/22/17 22:30


     


 


 


  (Norco  5-325 Mg)  1 tab  Q4H  PRN


 PO  12/22/17 22:30


     


 


 


  (Laurie-Colace)  1 tab  BID


 PO  12/23/17 09:00


    12/26/17 08:53


 


 


  (Milk Of


 Magnesia Liq)  30 ml  Q12H  PRN


 PO  12/22/17 22:30


     


 


 


  (Senokot)  17.2 mg  Q12H  PRN


 PO  12/22/17 22:30


     


 


 


  (Dulcolax Supp)  10 mg  DAILY  PRN


 RECTAL  12/22/17 22:30


     


 


 


  (Lactulose Liq)  30 ml  DAILY  PRN


 PO  12/22/17 22:30


     


 


 


  (Norco  Mg)  1 tab  Q4H  PRN


 PO  12/22/17 22:30


    12/23/17 09:09


 


 


 Aztreonam 1000 mg/


 Sodium Chloride  100 ml @ 


 200 mls/hr  Q8H


 IV  12/22/17 22:30


    12/27/17 05:37


 


 


  (Wellbutrin)  150 mg  HS


 PO  12/23/17 21:00


    12/24/17 21:15


 


 


  (Geodon)  120 mg  HS


 PO  12/23/17 21:00


    12/24/17 21:16


 


 


 Levofloxacin/


 Dextrose  150 ml @ 


 100 mls/hr  Q24H


 IV  12/26/17 16:00


     


 











A/P


Problem List:  


(1) Sepsis


ICD Code:  A41.9 - Sepsis, unspecified organism


(2) Cellulitis


ICD Code:  L03.90 - Cellulitis, unspecified


Status:  Acute


(3) Cerebral palsy


ICD Code:  G80.9 - Cerebral palsy, unspecified


Status:  Chronic


(4) Fall


ICD Code:  W19.XXXA - Unspecified fall, initial encounter


Assessment and Plan


1.  Severe sepsis with cellulitis of bilateral lower extremities worse on the 

left.  One aerobic bottle with gram-positive cocci the other with coag negative 

and strep likely secondary to cellulitis. Continue broad-spectrum IV 

antibiotics with vancomycin and aztreonam.  Follow-up blood culture final 

results and consider ID consult


12/26 Suspect blood culture findings likely contaminants.  Continue IV 

antibiotics.  Patient currently on IV vancomycin and IV Aztreonam.  Discontinue 

IV vancomycin, will start on IV Levaquin and continue aztreonam. Will start on 

Lactobacilus Acidophilus to prevent C diff.


12/27 Continue IV antibiotics, Consult ID.





2.  Elevated AST likely secondary to sepsis and rhabdomyolisis. 


12/26 CK on admission was 3276, treated with IV fluids and trending down.  AST 

initially trended up from 58-84, now trending down to 64.  Check hepatitis 

profile if not previously done.





3.  BRIGITTE with hypokalemia.  Improved discontinue IV hydration.  Replace 

electrolytes accordingly.  Avoid nephrotoxins


12/26 soft after IV fluid administration.  AKA likely due to prerenal azotemia 

due to the hydration.  IV fluids discontinued.


Hypokalemia resolved after repletion.  Continue to monitor BMP.





4.  Cerebral Palsy:  At baseline.  Patient with mild developmental delay.





5.  Fall:  s/p mechanical trip and fall, reports multiple falls in last few 

days w/ associated dizziness, likely secondary to acute infection/dehydration.  

CT Head w/ no acute findings, images reviewed by me.  No other injuries noted. 

PT recommended home health care versus rehabilitation patient prefers to be 

discharged to [facility].





6.  Rhabdomyolysis status post fall.  Improved after IV fluid administration.





7. Hyponatremia: Resolved after IVF administration. Monitor BMP.





DVT Prophylaxis:  Heparin sq


Discharge Planning


Continue to monitor in the medical floor.  Anticipate discharge in 1-2 days 

pending clinical improvement of cellulitis.





Problem Qualifiers





(1) Sepsis:  


Qualified Codes:  A41.9 - Sepsis, unspecified organism


(2) Cellulitis:  


Qualified Codes:  L03.116 - Cellulitis of left lower limb


(3) Cerebral palsy:  


Qualified Codes:  G80.9 - Cerebral palsy, unspecified


(4) Fall:  


Qualified Codes:  W19.XXXD - Unspecified fall, subsequent encounter








Aki Ying MD Dec 27, 2017 14:51

## 2017-12-28 VITALS
RESPIRATION RATE: 18 BRPM | OXYGEN SATURATION: 92 % | HEART RATE: 76 BPM | SYSTOLIC BLOOD PRESSURE: 146 MMHG | TEMPERATURE: 98.6 F | DIASTOLIC BLOOD PRESSURE: 69 MMHG

## 2017-12-28 VITALS
HEART RATE: 77 BPM | TEMPERATURE: 97.8 F | RESPIRATION RATE: 16 BRPM | OXYGEN SATURATION: 94 % | DIASTOLIC BLOOD PRESSURE: 66 MMHG | SYSTOLIC BLOOD PRESSURE: 119 MMHG

## 2017-12-28 VITALS
RESPIRATION RATE: 18 BRPM | SYSTOLIC BLOOD PRESSURE: 138 MMHG | OXYGEN SATURATION: 95 % | DIASTOLIC BLOOD PRESSURE: 69 MMHG | TEMPERATURE: 98.1 F | HEART RATE: 84 BPM

## 2017-12-28 VITALS
TEMPERATURE: 97.8 F | SYSTOLIC BLOOD PRESSURE: 130 MMHG | DIASTOLIC BLOOD PRESSURE: 65 MMHG | OXYGEN SATURATION: 94 % | RESPIRATION RATE: 18 BRPM | HEART RATE: 77 BPM

## 2017-12-28 VITALS
HEART RATE: 82 BPM | OXYGEN SATURATION: 95 % | TEMPERATURE: 98.3 F | DIASTOLIC BLOOD PRESSURE: 73 MMHG | RESPIRATION RATE: 18 BRPM | SYSTOLIC BLOOD PRESSURE: 136 MMHG

## 2017-12-28 VITALS
HEART RATE: 84 BPM | OXYGEN SATURATION: 93 % | RESPIRATION RATE: 18 BRPM | TEMPERATURE: 98.1 F | SYSTOLIC BLOOD PRESSURE: 133 MMHG | DIASTOLIC BLOOD PRESSURE: 67 MMHG

## 2017-12-28 VITALS — HEART RATE: 83 BPM

## 2017-12-28 VITALS — HEART RATE: 86 BPM

## 2017-12-28 LAB
HEPATITIS A AB IGM: NEGATIVE
HEPATITIS B CORE AB IGM: NEGATIVE
HEPATITIS B SURFACE ANTIGEN: NEGATIVE
HEPATITIS C AB IGG: NEGATIVE

## 2017-12-28 RX ADMIN — SODIUM CHLORIDE SCH MLS/HR: 900 INJECTION INTRAVENOUS at 06:36

## 2017-12-28 RX ADMIN — Medication SCH ML: at 07:58

## 2017-12-28 RX ADMIN — STANDARDIZED SENNA CONCENTRATE AND DOCUSATE SODIUM SCH TAB: 8.6; 5 TABLET, FILM COATED ORAL at 21:00

## 2017-12-28 RX ADMIN — HEPARIN SODIUM SCH UNITS: 10000 INJECTION, SOLUTION INTRAVENOUS; SUBCUTANEOUS at 21:37

## 2017-12-28 RX ADMIN — SODIUM CHLORIDE SCH MLS/HR: 900 INJECTION INTRAVENOUS at 16:49

## 2017-12-28 RX ADMIN — STANDARDIZED SENNA CONCENTRATE AND DOCUSATE SODIUM SCH TAB: 8.6; 5 TABLET, FILM COATED ORAL at 07:58

## 2017-12-28 RX ADMIN — AZTREONAM SCH MLS/HR: 1 INJECTION, POWDER, LYOPHILIZED, FOR SOLUTION INTRAMUSCULAR; INTRAVENOUS at 06:36

## 2017-12-28 RX ADMIN — SODIUM CHLORIDE SCH MLS/HR: 900 INJECTION INTRAVENOUS at 00:30

## 2017-12-28 RX ADMIN — HEPARIN SODIUM SCH UNITS: 10000 INJECTION, SOLUTION INTRAVENOUS; SUBCUTANEOUS at 07:58

## 2017-12-28 RX ADMIN — ZIPRASIDONE HYDROCHLORIDE SCH MG: 60 CAPSULE ORAL at 21:38

## 2017-12-28 RX ADMIN — Medication SCH ML: at 21:38

## 2017-12-28 RX ADMIN — SODIUM CHLORIDE SCH MLS/HR: 900 INJECTION INTRAVENOUS at 12:27

## 2017-12-28 NOTE — HHI.IDPN
Note


Infectious Disease Note





Patient feels okay.


Afebrile.


R leg slightly less erythematous than yesterday. 


Tolerating Unasyn. 





Admitted to the hospital on 12/22/2017 with sepsis and cellulitis of the left 

lower extremity.  


This consultation was requested because the patient it is felt that the patient 

was not 


responding adequately to current antibiotic treatment.


 





 


PAST MEDICAL HISTORY


Anxiety.


Depression.


Hyperlipidemia.


Hypertension.


Cerebral palsy.


Bilateral knee replacement.


 


ALLERGIES


Cephalexin.


 


MEDICATIONS


1. Levaquin


2. Unasyn day #2.


 





Vital Signs








  Date Time  Temp Pulse Resp B/P (MAP) Pulse Ox O2 Delivery O2 Flow Rate FiO2


 


12/28/17 09:40      Room Air  


 


12/28/17 08:00  77      


 


12/28/17 04:00  82      


 


12/28/17 04:00 98.3 76 18 136/73 (94) 95   


 


12/28/17 00:00  77      


 


12/28/17 00:00 97.8 88 18 130/65 (86) 94   


 


12/27/17 20:30  82      


 


12/27/17 20:30      Room Air  


 


12/27/17 20:00 98.4 75 18 130/66 (87) 94   


 


12/27/17 17:57      Room Air  


 


12/27/17 16:00  65      


 


12/27/17 16:00 97.8 73 18 136/75 (95) 95   


 


12/27/17 12:45  79      


 


12/27/17 12:26      Room Air  


 


12/27/17 12:00 98.3 74 18 136/66 (89) 96   








 


Laboratory Tests








Test


  12/26/17


13:50 12/27/17


07:10


 


White Blood Count 8.3 TH/MM3  9.6 TH/MM3 


 


Red Blood Count 4.17 MIL/MM3  4.39 MIL/MM3 


 


Hemoglobin 13.2 GM/DL  13.8 GM/DL 


 


Hematocrit 39.1 %  40.7 % 


 


Mean Corpuscular Volume 93.7 FL  92.8 FL 


 


Mean Corpuscular Hemoglobin 31.6 PG  31.5 PG 


 


Mean Corpuscular Hemoglobin


Concent 33.7 % 


  34.0 % 


 


 


Red Cell Distribution Width 13.2 %  13.6 % 


 


Platelet Count 273 TH/MM3  285 TH/MM3 


 


Mean Platelet Volume 7.5 FL  7.7 FL 


 


Neutrophils (%) (Auto)  65.8 % 


 


Lymphocytes (%) (Auto)  26.7 % 


 


Monocytes (%) (Auto)  6.7 % 


 


Eosinophils (%) (Auto)  0.6 % 


 


Basophils (%) (Auto)  0.2 % 


 


Neutrophils # (Auto)  6.3 TH/MM3 


 


Lymphocytes # (Auto)  2.6 TH/MM3 


 


Monocytes # (Auto)  0.6 TH/MM3 


 


Eosinophils # (Auto)  0.1 TH/MM3 


 


Basophils # (Auto)  0.0 TH/MM3 


 


CBC Comment  AUTO DIFF 


 


Differential Total Cells


Counted 


  100 


 


 


Neutrophils % (Manual)  80 % 


 


Band Neutrophils %  2 % 


 


Lymphocytes %  15 % 


 


Monocytes %  1 % 


 


Basophils %  1 % 


 


Neutrophils # (Manual)  8.0 TH/MM3 


 


Promyelocytes  1 % 


 


Differential Comment


  


  FINAL DIFF


MANUAL


 


Atypical Lymphocytes   % 


 


Toxic Granulation  1+ 


 


Platelet Estimate  NORMAL 


 


Platelet Morphology Comment  NORMAL 








Laboratory Tests








Test


  12/26/17


12:20 12/27/17


07:10


 


Blood Urea Nitrogen 12 MG/DL  10 MG/DL 


 


Creatinine 0.64 MG/DL  0.74 MG/DL 


 


Random Glucose 122 MG/DL  88 MG/DL 


 


Calcium Level 8.0 MG/DL  8.5 MG/DL 


 


Sodium Level 141 MEQ/L  138 MEQ/L 


 


Potassium Level 4.1 MEQ/L  3.9 MEQ/L 


 


Chloride Level 107 MEQ/L  102 MEQ/L 


 


Carbon Dioxide Level 25.5 MEQ/L  28.4 MEQ/L 


 


Anion Gap 9 MEQ/L  8 MEQ/L 


 


Estimat Glomerular Filtration


Rate 127 ML/MIN 


  107 ML/MIN 


 


 


Total Protein  6.9 GM/DL 


 


Albumin  2.0 GM/DL 


 


Phosphorus Level  2.9 MG/DL 


 


Magnesium Level  2.1 MG/DL 


 


Alkaline Phosphatase  74 U/L 


 


Aspartate Amino Transf


(AST/SGOT) 


  71 U/L 


 


 


Alanine Aminotransferase


(ALT/SGPT) 


  62 U/L 


 


 


Total Bilirubin  0.4 MG/DL 











PHYSICAL EXAMINATION


No acute distress.  Awake and alert and oriented.


HEENT:  Head is atraumatic.  Extraocular movements grossly intact, pupils


reactive to light.  No icterus.  Oropharynx: no lesions.  


NECK:  Supple without adenopathy.


Lungs:  Clear breath sounds.


Heart:  Regular rate and rhythm without murmurs, rubs or gallops. 


Abdomen: Obese, soft, no tenderness appreciated.


Extremities:  Multiple excoriated coin lesions at the arms and legs around the


tibia.  The left leg has confluent erythema which is less erythematous


circumferentially and also involving the left tibia and proximal aspect of the


left foot around the ankle.  There is a lesser degree of erythema at the right


tibia.


 





IMPRESSION


1. Cellulitis of the legs with the left very much more prominent than the


     right side.  Poor response to prior antibiotic treatment.


2. Allergic to cephalexin.


3. Sepsis which appears under control with treatment.


 


RECOMMENDATIONS


1. Continue Unasyn.


2. Monitor response to the Unasyn.


If he shows continued response tomorrow he can be discharged on PO Augmentin


x 10 days.











Evens Irvin MD Dec 28, 2017 11:54

## 2017-12-28 NOTE — HHI.PR
Subjective


Remarks


deferred entry.


Diarrhea this am.


Denies fevers or chills.





Objective


Vitals





Vital Signs








  Date Time  Temp Pulse Resp B/P (MAP) Pulse Ox O2 Delivery O2 Flow Rate FiO2


 


12/28/17 16:27  83      


 


12/28/17 16:00 98.1 84 18 138/69 (92) 95   


 


12/28/17 12:00  88      


 


12/28/17 12:00 98.1 84 18 133/67 (89) 93   


 


12/28/17 09:40      Room Air  


 


12/28/17 08:00 97.8 79 16 119/66 (83) 94   


 


12/28/17 08:00  77      


 


12/28/17 04:00  82      


 


12/28/17 04:00 98.3 76 18 136/73 (94) 95   


 


12/28/17 00:00  77      


 


12/28/17 00:00 97.8 88 18 130/65 (86) 94   


 


12/27/17 20:30  82      


 


12/27/17 20:30      Room Air  


 


12/27/17 20:00 98.4 75 18 130/66 (87) 94   














I/O      


 


 12/27/17 12/27/17 12/27/17 12/28/17 12/28/17 12/28/17





 07:00 15:00 23:00 07:00 15:00 23:00


 


Intake Total 240 ml  820 ml   


 


Output Total 300 ml     


 


Balance -60 ml  820 ml   


 


      


 


Intake Oral 240 ml  720 ml   


 


IV Total   100 ml   


 


Output Urine Total 300 ml     


 


# Voids 5  2   


 


# Bowel Movements 1  2   








Result Diagram:  


12/27/17 0710                                                                  

              12/27/17 0710





Imaging





Last Impressions








Chest X-Ray 12/22/17 1959 Signed





Impressions: 





 Service Date/Time:  Friday, December 22, 2017 20:06 - CONCLUSION: The lungs 

are 





 clear.     Rah Dobbins MD 


 


Head CT 12/22/17 0000 Signed





Impressions: 





 Service Date/Time:  Friday, December 22, 2017 22:19 - CONCLUSION:  1. Negative 





 noncontrast CT brain.     Rah Dobbins MD 








Objective Remarks


AAOx3


CLear lungs BL


S1S2 RRR


abdomen obese, soft, non tender non distended


Left lower extremity is erythematous, mild tender to palpation with edema and 

swelling.


Procedures


None


Medications and IVs





Current Medications








 Medications


  (Trade)  Dose


 Ordered  Sig/Amish


 Route  Start Time


 Stop Time Status Last Admin


 


  (NS Flush)  2 ml  UNSCH  PRN


 IV FLUSH  12/22/17 22:30


     


 


 


  (NS Flush)  2 ml  BID


 IV FLUSH  12/23/17 09:00


    12/28/17 07:58


 


 


  (Zofran Inj)  4 mg  Q6H  PRN


 IVP  12/22/17 22:30


    12/27/17 09:18


 


 


  (Heparin Inj)  5,000 units  Q12H


 SQ  12/23/17 09:00


    12/28/17 07:58


 


 


  (Tylenol)  650 mg  Q6H  PRN


 PO  12/22/17 22:30


     


 


 


  (Norco  5-325 Mg)  1 tab  Q4H  PRN


 PO  12/22/17 22:30


     


 


 


  (Laurie-Colace)  1 tab  BID


 PO  12/23/17 09:00


    12/26/17 08:53


 


 


  (Milk Of


 Magnesia Liq)  30 ml  Q12H  PRN


 PO  12/22/17 22:30


     


 


 


  (Senokot)  17.2 mg  Q12H  PRN


 PO  12/22/17 22:30


     


 


 


  (Dulcolax Supp)  10 mg  DAILY  PRN


 RECTAL  12/22/17 22:30


     


 


 


  (Lactulose Liq)  30 ml  DAILY  PRN


 PO  12/22/17 22:30


     


 


 


  (Norco  Mg)  1 tab  Q4H  PRN


 PO  12/22/17 22:30


    12/23/17 09:09


 


 


  (Wellbutrin)  150 mg  HS


 PO  12/23/17 21:00


    12/27/17 21:42


 


 


  (Geodon)  120 mg  HS


 PO  12/23/17 21:00


    12/27/17 21:38


 


 


 Ampicillin Sodium/


 Sulbactam Sodium


 3 gm/Sodium


 Chloride  100 ml @ 


 200 mls/hr  Q6H


 IV  12/27/17 18:30


    12/28/17 16:49


 











A/P


Problem List:  


(1) Sepsis


ICD Code:  A41.9 - Sepsis, unspecified organism


(2) Cellulitis


ICD Code:  L03.90 - Cellulitis, unspecified


Status:  Acute


(3) Cerebral palsy


ICD Code:  G80.9 - Cerebral palsy, unspecified


Status:  Chronic


(4) Fall


ICD Code:  W19.XXXA - Unspecified fall, initial encounter


Assessment and Plan


1.  Severe sepsis with cellulitis of bilateral lower extremities worse on the 

left.  One aerobic bottle with gram-positive cocci the other with coag negative 

and strep likely secondary to cellulitis. Continue broad-spectrum IV 

antibiotics with vancomycin and aztreonam.  Follow-up blood culture final 

results and consider ID consult


12/26 Suspect blood culture findings likely contaminants.  Continue IV 

antibiotics.  Patient currently on IV vancomycin and IV Aztreonam.  Discontinue 

IV vancomycin, will start on IV Levaquin and continue aztreonam. Will start on 

Lactobacilus Acidophilus to prevent C diff.


12/27 Continue IV antibiotics, Consult ID.


12/28 appreciate ID recommendations. IV Levaquin and Aztreonam discontinued. 

Started on Unasyn.





2.  Elevated AST likely secondary to sepsis and rhabdomyolisis. 


12/26 CK on admission was 3276, treated with IV fluids and trending down.  AST 

initially trended up from 58-84, now trending down to 64.  Check hepatitis 

profile if not previously done.


12/28 hep profile negative. Rhabdomyolysis resolved.





3.  BRIGITTE with hypokalemia.  Improved discontinue IV hydration.  Replace 

electrolytes accordingly.  Avoid nephrotoxins


12/26 soft after IV fluid administration.  AKA likely due to prerenal azotemia 

due to the hydration.  IV fluids discontinued.


Hypokalemia resolved after repletion.  Continue to monitor BMP.





4.  Cerebral Palsy:  At baseline.  Patient with mild developmental delay.





5.  Fall:  s/p mechanical trip and fall, reports multiple falls in last few 

days w/ associated dizziness, likely secondary to acute infection/dehydration.  

CT Head w/ no acute findings, images reviewed by me.  No other injuries noted. 

PT recommended home health care versus rehabilitation patient prefers to be 

discharged to [facility].





6.  Rhabdomyolysis status post fall.  Improved after IV fluid administration.





7. Hyponatremia: Resolved after IVF administration. Monitor BMP.





8. diarrhea: Patient at risk for C diff. Check stool for C diff PCR. Place on 

contact isolation until this is ruled out.





DVT Prophylaxis:  Heparin sq


Discharge Planning


Continue to monitor in the medical floor.  Anticipate discharge in 1-2 days 

pending clinical improvement of cellulitis.





Problem Qualifiers





(1) Sepsis:  


Qualified Codes:  A41.9 - Sepsis, unspecified organism


(2) Cellulitis:  


Qualified Codes:  L03.116 - Cellulitis of left lower limb


(3) Cerebral palsy:  


Qualified Codes:  G80.9 - Cerebral palsy, unspecified


(4) Fall:  


Qualified Codes:  W19.XXXD - Unspecified fall, subsequent encounter








Aki Ying MD Dec 28, 2017 18:45

## 2017-12-29 VITALS
HEART RATE: 84 BPM | RESPIRATION RATE: 18 BRPM | DIASTOLIC BLOOD PRESSURE: 65 MMHG | TEMPERATURE: 99.4 F | SYSTOLIC BLOOD PRESSURE: 132 MMHG | OXYGEN SATURATION: 96 %

## 2017-12-29 VITALS — HEART RATE: 79 BPM

## 2017-12-29 VITALS
RESPIRATION RATE: 20 BRPM | TEMPERATURE: 98.5 F | SYSTOLIC BLOOD PRESSURE: 116 MMHG | DIASTOLIC BLOOD PRESSURE: 57 MMHG | OXYGEN SATURATION: 95 % | HEART RATE: 73 BPM

## 2017-12-29 VITALS
TEMPERATURE: 98.4 F | DIASTOLIC BLOOD PRESSURE: 78 MMHG | HEART RATE: 72 BPM | SYSTOLIC BLOOD PRESSURE: 140 MMHG | OXYGEN SATURATION: 96 % | RESPIRATION RATE: 21 BRPM

## 2017-12-29 VITALS
SYSTOLIC BLOOD PRESSURE: 164 MMHG | HEART RATE: 88 BPM | RESPIRATION RATE: 18 BRPM | OXYGEN SATURATION: 95 % | DIASTOLIC BLOOD PRESSURE: 73 MMHG | TEMPERATURE: 98.5 F

## 2017-12-29 VITALS — HEART RATE: 75 BPM

## 2017-12-29 RX ADMIN — SODIUM CHLORIDE SCH MLS/HR: 900 INJECTION INTRAVENOUS at 12:30

## 2017-12-29 RX ADMIN — SODIUM CHLORIDE SCH MLS/HR: 900 INJECTION INTRAVENOUS at 00:56

## 2017-12-29 RX ADMIN — HEPARIN SODIUM SCH UNITS: 10000 INJECTION, SOLUTION INTRAVENOUS; SUBCUTANEOUS at 08:54

## 2017-12-29 RX ADMIN — ONDANSETRON PRN MG: 2 INJECTION, SOLUTION INTRAMUSCULAR; INTRAVENOUS at 06:30

## 2017-12-29 RX ADMIN — STANDARDIZED SENNA CONCENTRATE AND DOCUSATE SODIUM SCH TAB: 8.6; 5 TABLET, FILM COATED ORAL at 08:51

## 2017-12-29 RX ADMIN — Medication SCH ML: at 08:51

## 2017-12-29 RX ADMIN — SODIUM CHLORIDE SCH MLS/HR: 900 INJECTION INTRAVENOUS at 06:28

## 2017-12-29 NOTE — HHI.DS
__________________________________________________





Discharge Summary


Admission Date


Dec 22, 2017 at 22:19


Discharge Date:  Dec 29, 2017


Admitting Diagnosis





sepsis, BLE cellulitis





(1) Sepsis


ICD Code:  A41.9 - Sepsis, unspecified organism


Diagnosis:  Principal


Status:  Resolved


(2) Cellulitis


ICD Code:  L03.90 - Cellulitis, unspecified


Diagnosis:  Principal


Status:  Acute


(3) Cerebral palsy


ICD Code:  G80.9 - Cerebral palsy, unspecified


Diagnosis:  Principal


Status:  Chronic


(4) Fall


ICD Code:  W19.XXXA - Unspecified fall, initial encounter


Diagnosis:  Principal





(5) BRIGITTE (acute kidney injury)


ICD Code:  N17.9 - Acute kidney failure, unspecified


Diagnosis:  Principal


Status:  Resolved


(6) Diarrhea


ICD Code:  R19.7 - Diarrhea, unspecified


Diagnosis:  Principal


Status:  Resolved


Procedures


None


Brief History - From Admission


This is a 62-year-old male with PMH of Anxiety, Depression, HTN, Hyperlipidemia 

and Cerebral Palsy was brought to the ER by EMS secondary to fall at home while 

getting out of the shower.  Denies LOC or head trauma, but reports significant 

dizziness/lightheadedness.  Also reports cough, generalized weakness and 

subjective fever/chills at home.  On arrival, /68, , O2 sat 95% on 

RA, Temp 101.8.  WBC 19.3, bandemia 13%.  Creatinine 1.52, previously 1.23 on 3/

30/17.  Lactic Acid 3.1.  INR 1.3.  UA negative.  CXR with no acute findings.  

CT Head negative.  On exam, patient noted to have lower extremity cellulitis 

bilaterally.  S/p Blood Cultures/Clinda IV in ER.


CBC/BMP:  


12/27/17 0710                                                                  

              12/27/17 0710





Significant Findings





Laboratory Tests








Test


  12/26/17


12:20 12/26/17


13:50 12/27/17


07:10 12/28/17


07:51


 


Random Glucose


  122 MG/DL


() 


  


  


 


 


Calcium Level


  8.0 MG/DL


(8.5-10.1) 


  


  


 


 


Red Blood Count


  


  4.17 MIL/MM3


(4.50-5.90) 4.39 MIL/MM3


(4.50-5.90) 


 


 


Neutrophils % (Manual)   80 % (16-70)  


 


Neutrophils # (Manual)


  


  


  8.0 TH/MM3


(1.8-7.7) 


 


 


Promyelocytes   1 % (0-0)  


 


Toxic Granulation   1+ (NORMAL)  


 


Albumin


  


  


  2.0 GM/DL


(3.4-5.0) 


 


 


Aspartate Amino Transf


(AST/SGOT) 


  


  71 U/L (15-37) 


  


 


 


Test


  12/29/17


06:45 


  


  


 








Imaging





Last Impressions








Chest X-Ray 12/22/17 1959 Signed





Impressions: 





 Service Date/Time:  Friday, December 22, 2017 20:06 - CONCLUSION: The lungs 

are 





 clear.     Rah Dobbins MD 


 


Head CT 12/22/17 0000 Signed





Impressions: 





 Service Date/Time:  Friday, December 22, 2017 22:19 - CONCLUSION:  1. Negative 





 noncontrast CT brain.     Rah Dobbins MD 








PE at Discharge


AAOx3


CLear lungs BL


S1S2 RRR


abdomen obese, soft, non tender non distended


Left lower extremity is erythematous, mild tender to palpation with edema and 

swelling.


Pt update on day of discharge


Denies fevers or chills. Denies cp/sob. Leg is less swollen and erythematous.


Hospital Course


1.  Severe sepsis with cellulitis of bilateral lower extremities worse on the 

left.


Was initially started on IV vancomycin, aztreonam and IV Levaquin.  ID 

consulted recommended switching antibiotics to Unasyn.  The patient was also 

started on Lactobacillus acidophilus to prevent C. difficile diarrhea.  Blood 

cultures were negative 5 days.





2.  Elevated AST likely secondary to sepsis and rhabdomyolisis. 


CK on admission was 3276, treated with IV fluids.  Hepatitis profile ordered 

for elevated AST and negative.  Rhabdomyolysis resolved prior to discharge.





3.  BRIGITTE with hypokalemia.  


Treated with IV fluids.  Resolved after IV fluid administration.  BUN 

creatinine monitored throughout hospital stay.  Electrolytes also monitor and 

replace as needed.


Patient acute kidney injury likely secondary to prerenal azotemia due to 

dehydration.  Hypokalemia resolved after replacement.





4.  Cerebral Palsy: Chronic, At baseline.  Patient with mild developmental 

delay.





5.  Fall:  s/p mechanical trip and fall, reports multiple falls in last few 

days w/ associated dizziness, likely secondary to acute infection/dehydration.  

CT Head w/ no acute findings, images reviewed by me.  No other injuries noted. 

PT recommended home health care versus rehabilitation patient prefers to be 

discharged to [facility].





6.  Rhabdomyolysis status post fall.  Improved after IV fluid administration.





7. Hyponatremia: Resolved after IVF administration. Monitor BMP.





8. diarrhea: C. difficile negative.  Diarrhea resolved.





DVT Prophylaxis:  Heparin sq


Pt Condition on Discharge:  Stable


Discharge Disposition:  Discharge to SNF


Discharge Time:  > 30 minutes


Discharge Instructions


DIET: Follow Instructions for:  As Tolerated, No Restrictions


Activities you can perform:  Regular-No Restrictions


Activities to Avoid:  Contact Sports, Prolonged Standing, Strenuous Activity


Follow up Referrals:  


PCP Follow-up - 1 Week





New Medications:  


Amoxicillin-Clavulanate (Augmentin) 875-125 Mg Tab


1 TAB PO BID for Infection, #20 TAB 0 Refills





Hydrocodone/Acetaminophen (Hydrocodone-Acetamin  mg) 10 Mg-325 Mg Tablet


1 TAB PO Q6H PRN for PAIN 6-10, #28 TAB





 


Continued Medications:  


Bupropion HCl (Bupropion HCl) 100 Mg Tab


150 MG PO HS for Control Depression, TAB 0 Refills





Ziprasidone (Ziprasidone) 60 Mg Cap


120 MG PO HS, #60 CAP 0 Refills

















Aki Ying MD Dec 29, 2017 12:11

## 2017-12-31 ENCOUNTER — HOSPITAL ENCOUNTER (INPATIENT)
Dept: HOSPITAL 17 - NEPC | Age: 62
LOS: 2 days | Discharge: NURSING, REHAB OR LONG-TERM CARE | DRG: 560 | End: 2018-01-02
Attending: HOSPITALIST | Admitting: HOSPITALIST
Payer: MEDICARE

## 2017-12-31 VITALS — RESPIRATION RATE: 18 BRPM | OXYGEN SATURATION: 98 %

## 2017-12-31 VITALS — HEIGHT: 68 IN | WEIGHT: 229.28 LBS | BODY MASS INDEX: 34.75 KG/M2

## 2017-12-31 VITALS
DIASTOLIC BLOOD PRESSURE: 61 MMHG | HEART RATE: 98 BPM | RESPIRATION RATE: 20 BRPM | SYSTOLIC BLOOD PRESSURE: 124 MMHG | OXYGEN SATURATION: 94 % | TEMPERATURE: 98.8 F

## 2017-12-31 VITALS
OXYGEN SATURATION: 99 % | HEART RATE: 82 BPM | DIASTOLIC BLOOD PRESSURE: 92 MMHG | TEMPERATURE: 98.4 F | RESPIRATION RATE: 18 BRPM | SYSTOLIC BLOOD PRESSURE: 146 MMHG

## 2017-12-31 VITALS
HEART RATE: 88 BPM | TEMPERATURE: 98.3 F | OXYGEN SATURATION: 93 % | SYSTOLIC BLOOD PRESSURE: 132 MMHG | DIASTOLIC BLOOD PRESSURE: 68 MMHG | RESPIRATION RATE: 18 BRPM

## 2017-12-31 VITALS — HEART RATE: 91 BPM

## 2017-12-31 DIAGNOSIS — Z91.14: ICD-10-CM

## 2017-12-31 DIAGNOSIS — K21.9: ICD-10-CM

## 2017-12-31 DIAGNOSIS — Z87.891: ICD-10-CM

## 2017-12-31 DIAGNOSIS — R19.7: ICD-10-CM

## 2017-12-31 DIAGNOSIS — L03.116: ICD-10-CM

## 2017-12-31 DIAGNOSIS — M19.90: ICD-10-CM

## 2017-12-31 DIAGNOSIS — F41.9: ICD-10-CM

## 2017-12-31 DIAGNOSIS — J43.9: ICD-10-CM

## 2017-12-31 DIAGNOSIS — G80.9: ICD-10-CM

## 2017-12-31 DIAGNOSIS — F31.9: ICD-10-CM

## 2017-12-31 DIAGNOSIS — I10: ICD-10-CM

## 2017-12-31 DIAGNOSIS — R29.6: ICD-10-CM
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
## 2017-12-31 DIAGNOSIS — B37.2: ICD-10-CM

## 2017-12-31 DIAGNOSIS — N28.9: ICD-10-CM
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
## 2017-12-31 DIAGNOSIS — Z91.19: ICD-10-CM

## 2017-12-31 DIAGNOSIS — Z88.1: ICD-10-CM
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
## 2017-12-31 DIAGNOSIS — E66.9: ICD-10-CM

## 2017-12-31 DIAGNOSIS — T84.023A: Primary | ICD-10-CM

## 2017-12-31 LAB
BASOPHILS # BLD AUTO: 0 TH/MM3 (ref 0–0.2)
BASOPHILS NFR BLD: 0.3 % (ref 0–2)
BUN SERPL-MCNC: 18 MG/DL (ref 7–18)
CALCIUM SERPL-MCNC: 9.1 MG/DL (ref 8.5–10.1)
CHLORIDE SERPL-SCNC: 99 MEQ/L (ref 98–107)
CREAT SERPL-MCNC: 1.21 MG/DL (ref 0.6–1.3)
CRP SERPL-MCNC: 5.36 MG/DL (ref 0–0.3)
EOSINOPHIL # BLD: 0 TH/MM3 (ref 0–0.4)
EOSINOPHIL NFR BLD: 0.3 % (ref 0–4)
ERYTHROCYTE [DISTWIDTH] IN BLOOD BY AUTOMATED COUNT: 13.6 % (ref 11.6–17.2)
GFR SERPLBLD BASED ON 1.73 SQ M-ARVRAT: 61 ML/MIN (ref 89–?)
GLUCOSE SERPL-MCNC: 96 MG/DL (ref 74–106)
HCO3 BLD-SCNC: 32.6 MEQ/L (ref 21–32)
HCT VFR BLD CALC: 44.8 % (ref 39–51)
HGB BLD-MCNC: 15.4 GM/DL (ref 13–17)
LYMPHOCYTES # BLD AUTO: 2.8 TH/MM3 (ref 1–4.8)
LYMPHOCYTES NFR BLD AUTO: 24.7 % (ref 9–44)
MAGNESIUM SERPL-MCNC: 2.4 MG/DL (ref 1.5–2.5)
MCH RBC QN AUTO: 31.9 PG (ref 27–34)
MCHC RBC AUTO-ENTMCNC: 34.5 % (ref 32–36)
MCV RBC AUTO: 92.3 FL (ref 80–100)
MONOCYTE #: 0.8 TH/MM3 (ref 0–0.9)
MONOCYTES NFR BLD: 7.4 % (ref 0–8)
NEUTROPHILS # BLD AUTO: 7.5 TH/MM3 (ref 1.8–7.7)
NEUTROPHILS NFR BLD AUTO: 67.3 % (ref 16–70)
PLATELET # BLD: 414 TH/MM3 (ref 150–450)
PMV BLD AUTO: 6.9 FL (ref 7–11)
RBC # BLD AUTO: 4.85 MIL/MM3 (ref 4.5–5.9)
SODIUM SERPL-SCNC: 135 MEQ/L (ref 136–145)
WBC # BLD AUTO: 11.2 TH/MM3 (ref 4–11)

## 2017-12-31 PROCEDURE — 87040 BLOOD CULTURE FOR BACTERIA: CPT

## 2017-12-31 PROCEDURE — 80048 BASIC METABOLIC PNL TOTAL CA: CPT

## 2017-12-31 PROCEDURE — 73560 X-RAY EXAM OF KNEE 1 OR 2: CPT

## 2017-12-31 PROCEDURE — 83735 ASSAY OF MAGNESIUM: CPT

## 2017-12-31 PROCEDURE — 76937 US GUIDE VASCULAR ACCESS: CPT

## 2017-12-31 PROCEDURE — 96366 THER/PROPH/DIAG IV INF ADDON: CPT

## 2017-12-31 PROCEDURE — 96375 TX/PRO/DX INJ NEW DRUG ADDON: CPT

## 2017-12-31 PROCEDURE — 85025 COMPLETE CBC W/AUTO DIFF WBC: CPT

## 2017-12-31 PROCEDURE — 73610 X-RAY EXAM OF ANKLE: CPT

## 2017-12-31 PROCEDURE — 86140 C-REACTIVE PROTEIN: CPT

## 2017-12-31 PROCEDURE — 73564 X-RAY EXAM KNEE 4 OR MORE: CPT

## 2017-12-31 PROCEDURE — 83605 ASSAY OF LACTIC ACID: CPT

## 2017-12-31 PROCEDURE — 73590 X-RAY EXAM OF LOWER LEG: CPT

## 2017-12-31 PROCEDURE — G0378 HOSPITAL OBSERVATION PER HR: HCPCS

## 2017-12-31 PROCEDURE — L1830 KO IMMOB CANVAS LONG PRE OTS: HCPCS

## 2017-12-31 PROCEDURE — 73700 CT LOWER EXTREMITY W/O DYE: CPT

## 2017-12-31 PROCEDURE — 96365 THER/PROPH/DIAG IV INF INIT: CPT

## 2017-12-31 PROCEDURE — 93970 EXTREMITY STUDY: CPT

## 2017-12-31 RX ADMIN — NYSTATIN SCH APPLIC: 100000 POWDER TOPICAL at 21:05

## 2017-12-31 RX ADMIN — SODIUM CHLORIDE SCH MLS/HR: 900 INJECTION INTRAVENOUS at 21:06

## 2017-12-31 RX ADMIN — ZIPRASIDONE HYDROCHLORIDE SCH MG: 60 CAPSULE ORAL at 21:04

## 2017-12-31 RX ADMIN — BUPROPION HYDROCHLORIDE SCH MG: 150 TABLET, FILM COATED ORAL at 21:05

## 2017-12-31 RX ADMIN — Medication SCH ML: at 21:05

## 2017-12-31 NOTE — RADRPT
EXAM DATE/TIME:  12/31/2017 13:44 

 

[facility] COMPARISON:     

No previous studies available for comparison.

 

                     

INDICATIONS :     

Left tibia/fibula pain and swelling. 

                     

 

MEDICAL HISTORY :            

Renal calculi. Hypertension. Cerebral palsy. Cellulitis.    

 

SURGICAL HISTORY :     

None.   

 

ENCOUNTER:     

Initial                                        

 

ACUITY:     

1 day      

 

PAIN SCORE:     

8/10

 

LOCATION:     

Left  tibia/fibula. 

 

FINDINGS:     

There is a total knee prosthesis in place. There is an old healed proximal fibular fracture. An acute
 fracture is not seen. The ankle is aligned. There is diffuse soft tissue swelling in the superficial
 fat.

 

CONCLUSION:     

No acute bony abnormality is seen.

 

 

 

 Maximo Whitney MD on December 31, 2017 at 14:12           

Board Certified Radiologist.

 This report was verified electronically.

## 2017-12-31 NOTE — RADRPT
EXAM DATE/TIME:  12/31/2017 16:56 

 

CORRECTION

Corrected on: January 01, 2018; 

 

 

 

[facility] COMPARISON:     

No previous studies available for comparison.

 

 

INDICATIONS :     

Left knee pain status post fall today.

                      

 

RADIATION DOSE:     

11.03 CTDIvol (mGy) 

 

 

MEDICAL HISTORY :     

Hypertension.   cerebral palsy

 

SURGICAL HISTORY :         

bilateral knee replacement

 

ENCOUNTER:      

Initial

 

ACUITY:      

1 day

 

PAIN SCALE:      

10/10

 

LOCATION:       

Left  knee

 

TECHNIQUE:     

Volumetric scanning of the knee was performed.  Using automated exposure control and adjustment of th
e mA and/or kV according to patient size, radiation dose was kept as low as reasonably achievable to 
obtain optimal diagnostic quality images.  DICOM format image data is available electronically for re
view and comparison.  

 

FINDINGS:     

     There is a tiny joint effusion. The bony arthroplasty is present and there is no evidence for fr
acture loosening, however the tibia is dislocated posteriorly with respect to the femoral component w
hich is located anteriorly.

 

CONCLUSION:     

Dislocation of the femoral and tibial components with tibial dislocated posteriorly.

 

 

 

 CAPO Fermin MD on January 01, 2018 at 15:23           

Board Certified Radiologist.

 This report was verified electronically.

## 2017-12-31 NOTE — PD
HPI


Chief Complaint:  Fall


Time Seen by Provider:  13:22


Travel History


International Travel<30 days:  No


Contact w/Intl Traveler<30days:  No


Traveled to known affect area:  No





History of Present Illness


HPI


62-year-old male that presents to the ED for evaluation of fall.  Patient came 

here for evaluation of this.  Per patient she had a mechanical fall and fell 

into his left side.  He injured his left knee.  She has a history of cerebral 

palsy and left leg cellulitis.  Patient has had surgeries on that leg.  Per 

patient he was discharged on the 28 with oral antibiotics but he has not been 

able to fill the prescription.  Per patient given to a friend who was supposed 

to give it to him but he has not gotten it yet.  Per patient he still has pain 

and swelling on his left leg.  He denies any chest pain or shortness of breath.

  No head injury.  No blood thinner use at this time.  Denies any pain in the 

abdomen.  Denies any other injuries.  Patient states that his pain is 4 out of 

10.  He lives alone and apparently has had some home care will he has not had 

it yet.  He has an allergy to cephalexin.  He he states having some chills and 

sweats as well as possible fevers.  She has not checked his temperature 

however.  He was recently admitted for the infection of the left leg as well as 

multiple falls.  He currently denies any dizziness or any other injury.





PFSH


Past Medical History


Arthritis:  Yes


Asthma:  No


Autoimmune Disease:  No


Blood Disorders:  No


Bipolar Disorder:  Yes


Anxiety:  Yes


Depression:  No


Heart Rhythm Problems:  No


Cancer:  No


Cardiovascular Problems:  Yes (HTN )


Cerebral Palsy:  Yes


High Cholesterol:  Yes


Chemotherapy:  No


Chest Pain:  No


Congestive Heart Failure:  No


COPD:  No


Cerebrovascular Accident:  No


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


Diabetes:  No


Diminished Hearing:  No


Diverticulitis:  Yes


Endocrine:  No


Gastrointestinal Disorders:  Yes (DIVERTICULITIS)


GERD:  Yes


Glaucoma:  No


Genitourinary:  No


Headaches:  No


Hepatitis:  No


Hiatal Hernia:  No


Heparin Induced Thrombocytopen:  No


Hypertension:  Yes


Immune Disorder:  No


Implanted Vascular Access Dvce:  Yes (PT STATES "TWO TOTAL KNEE REPLACEMENTS")


Kidney Stones:  Yes (WITH LITHOTRIPSY)


Musculoskeletal:  Yes


Neurologic:  Yes


Psychiatric:  Yes


Reproductive:  No


Respiratory:  No


Immunizations Current:  Yes


Migraines:  No


Myocardial Infarction:  No


Radiation Therapy:  No


Renal Failure:  No


Seizures:  No


Sickle Cell Disease:  No


Sleep Apnea:  No


Thyroid Disease:  No


Ulcer:  No


PNEUMOCCOCAL Vaccine (Year):  2010


Pregnant?:  Not Pregnant





Past Surgical History


Abdominal Surgery:  No


AICD:  No


Appendectomy:  No


Arteriovenous Shunt:  No


Cardiac Surgery:  No


Cholecystectomy:  No


Ear Surgery:  No


Endocrine Surgery:  No


Eye Surgery:  No


Genitourinary Surgery:  No


Gynecologic Surgery:  No


Insulin Pump:  No


Joint Replacement:  Yes (BILATERAL KNEES)


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 

now per patient repor)


Oral Surgery:  No


Pacemaker:  No


Thoracic Surgery:  No


Tonsillectomy:  Yes


Other Surgery:  Yes





Social History


Alcohol Use:  No


Tobacco Use:  No


Substance Use:  No





Allergies-Medications


(Allergen,Severity, Reaction):  


Coded Allergies:  


     cephalexin (Unverified  Allergy, Severe, RASH, 8/15/17)


Reported Meds & Prescriptions





Reported Meds & Active Scripts


Active


Augmentin (Amoxicillin-Clavulanate) 875-125 Mg Tab 1 Tab PO BID


Reported


Hydroxyzine Pamoate 50 Mg Cap 50 Mg PO HS


Bupropion HCl 100 Mg Tab 150 Mg PO HS


Ziprasidone 60 Mg Cap 120 Mg PO HS








Review of Systems


Except as stated in HPI:  all other systems reviewed are Neg





Physical Exam


Narrative


GENERAL: 


SKIN: Warm and dry.


HEAD: Atraumatic. Normocephalic. 


EYES: Pupils equal and round. No scleral icterus. No injection or drainage. 


ENT: No nasal bleeding or discharge.  Mucous membranes pink and moist.  Tongue 

is midline.  No uvula deviation.


NECK: Trachea midline. No JVD. 


CARDIOVASCULAR: Regular rate and rhythm.  No murmurs, S3, S4.


RESPIRATORY: No accessory muscle use. Clear to auscultation. Breath sounds 

equal bilaterally. 


GASTROINTESTINAL: Abdomen soft, non-tender, nondistended. Hepatic and splenic 

margins not palpable. 


MUSCULOSKELETAL: Extremities without clubbing, cyanosis, or edema. No obvious 

deformities.  Full range of motion of the upper and lower extremity is 

bilaterally.  Patient has erythema and swelling on the left leg.  Most of the 

erythema is on the dorsal aspect of the left foot as well as the medial aspect 

of the foot.  He does appear to have an area of edema to could have some fluid 

possible early abscess noted.  Erythema is about 20 cm in diameter.


NEUROLOGICAL: Awake and alert. No obvious cranial nerve deficits.  Motor 

grossly within normal limits. Five out of 5 muscle strength in the arms and 

legs.  Normal speech.


PSYCHIATRIC: Appropriate mood and affect; insight and judgment normal.





Data


Data


Last Documented VS





Vital Signs








  Date Time  Temp Pulse Resp B/P (MAP) Pulse Ox O2 Delivery O2 Flow Rate FiO2


 


12/31/17 13:44   18  98 Room Air  


 


12/31/17 13:24  86      


 


12/31/17 13:23 98.4       








Orders





 Orders


Complete Blood Count With Diff (12/31/17 13:30)


Basic Metabolic Panel (Bmp) (12/31/17 13:30)


Blood Culture (12/31/17 13:30)


C-Reactive Protein (Crp) (12/31/17 13:30)


Magnesium (Mg) (12/31/17 13:30)


Iv Access Insert/Monitor (12/31/17 13:30)


Ecg Monitoring (12/31/17 13:30)


Oximetry (12/31/17 13:30)


Ankle, Complete ([id number]) (12/31/17 13:30)


Tibia/Fibula (Ap/Lat) (12/31/17 13:30)


Piperacil-Tazo 3.375 Gm Premix (Zosyn 3. (12/31/17 13:30)


Lactic Acid Sepsis Protocol (12/31/17 13:33)


Ampicillin-Sulbactam Inj (Unasyn Inj) (12/31/17 13:45)


Sodium Chlor 0.9% 1000 Ml Inj (Ns 1000 M (12/31/17 13:45)


Knee, Complete (4vws) (12/31/17 )


Morphine Inj (Morphine Inj) (12/31/17 15:00)


Ondansetron Inj (Zofran Inj) (12/31/17 15:00)


Morphine Inj (Morphine Inj) (12/31/17 15:15)


Admit Order (Ed Use Only) (12/31/17 15:32)





Labs





Laboratory Tests








Test


  12/31/17


13:36


 


White Blood Count 11.2 TH/MM3 


 


Red Blood Count 4.85 MIL/MM3 


 


Hemoglobin 15.4 GM/DL 


 


Hematocrit 44.8 % 


 


Mean Corpuscular Volume 92.3 FL 


 


Mean Corpuscular Hemoglobin 31.9 PG 


 


Mean Corpuscular Hemoglobin


Concent 34.5 % 


 


 


Red Cell Distribution Width 13.6 % 


 


Platelet Count 414 TH/MM3 


 


Mean Platelet Volume 6.9 FL 


 


Neutrophils (%) (Auto) 67.3 % 


 


Lymphocytes (%) (Auto) 24.7 % 


 


Monocytes (%) (Auto) 7.4 % 


 


Eosinophils (%) (Auto) 0.3 % 


 


Basophils (%) (Auto) 0.3 % 


 


Neutrophils # (Auto) 7.5 TH/MM3 


 


Lymphocytes # (Auto) 2.8 TH/MM3 


 


Monocytes # (Auto) 0.8 TH/MM3 


 


Eosinophils # (Auto) 0.0 TH/MM3 


 


Basophils # (Auto) 0.0 TH/MM3 


 


CBC Comment DIFF FINAL 


 


Differential Comment  


 


Blood Urea Nitrogen 18 MG/DL 


 


Creatinine 1.21 MG/DL 


 


Random Glucose 96 MG/DL 


 


Calcium Level 9.1 MG/DL 


 


Magnesium Level 2.4 MG/DL 


 


Sodium Level 135 MEQ/L 


 


Potassium Level 4.6 MEQ/L 


 


Chloride Level 99 MEQ/L 


 


Carbon Dioxide Level 32.6 MEQ/L 


 


Anion Gap 3 MEQ/L 


 


Estimat Glomerular Filtration


Rate 61 ML/MIN 


 


 


Lactic Acid Level 1.3 mmol/L 


 


C-Reactive Protein 5.36 MG/DL 











MDM


Medical Decision Making


Medical Screen Exam Complete:  Yes


Emergency Medical Condition:  Yes


Medical Record Reviewed:  Yes


Interpretation(s)


CBC & BMP Diagram


12/31/17 13:36








Calcium Level 9.1, Magnesium Level 2.4








Last Impressions








Tibia/Fibula X-Ray 12/31/17 1330 Signed





Impressions: 





 Service Date/Time:  Sunday, December 31, 2017 13:44 - CONCLUSION:  No acute 

bony 





 abnormality is seen.     Maximo Whitney MD 


 


Ankle X-Ray 12/31/17 1330 Signed





Impressions: 





 Service Date/Time:  Sunday, December 31, 2017 13:45 - CONCLUSION:  Prominent 





 soft tissue swelling.     Maximo Whitney MD 


 


Knee X-Ray 12/31/17 0000 Signed





Impressions: 





 Service Date/Time:  Sunday, December 31, 2017 14:49 - CONCLUSION: Intact total 





 hip prosthesis for technique.     K. Lane Fermin MD 








Differential Diagnosis


Cellulitis versus failed outpatient treatment versus noncompliance versus 

kidney injury versus dehydration versus fall versus fracture


Narrative Course


62-year-old male that presents to the ED for evaluation of fall.  Patient was 

properly examined and was found to have signs and symptoms consistent with 

appears to be consistent with infection of the left leg as well as fall.  X-

rays were ordered.  Labs and imaging showed elevated levels account.  Other was 

unremarkable.  Patient was reassured.  This time I do recommend admission for 

further evaluation of the cellulitis.  Patient is noncompliant in to me at the 

solids appears to be worsening.  Per patient also he believes that this will 

ice his worsening complaining of subjective fevers and chills.  He is 

noncompliant.  Because of this and the recommend admission for FOR IV 

antibiotics.  Case was discussed with my attending Dr. Castro who was made aware 

of findings and agrees with plan.  Case discussed with Dr. Rivera who agrees to 

admission.





Diagnosis





 Primary Impression:  


 Cellulitis


 Qualified Codes:  L03.116 - Cellulitis of left lower limb





Admitting Information


Admitting Physician Requests:  Observation











Tom Lancaster Dec 31, 2017 14:34

## 2017-12-31 NOTE — RADRPT
EXAM DATE/TIME:  12/31/2017 14:49 

 

CORRECTION

Corrected on: January 01, 2018; 

 

 

 

[facility] COMPARISON:     

No previous studies available for comparison.

 

                     

INDICATIONS :     

Pain from fall.

                     

 

MEDICAL HISTORY :     

None.          

 

SURGICAL HISTORY :        

Knee replacement.

 

ENCOUNTER:     

Initial                                        

 

ACUITY:     

1 day      

 

PAIN SCORE:     

7/10

 

LOCATION:     

Left  knee.

 

FINDINGS:     

The bony arthroplasty is present and there is no evidence of loosening or fracture, however the tibia
l component appears dislocated posteriorly with respect to the femoral component. There is old healed
 fracture of proximal fibula.

 

CONCLUSION:     

There is dislocation of the femoral tibial components the tibial component displaced posteriorly with
 respect to the femoral component. 

 

 

 CAPO Fermin MD on January 01, 2018 at 15:24           

Board Certified Radiologist.

 This report was verified electronically.

## 2017-12-31 NOTE — RADRPT
EXAM DATE/TIME:  12/31/2017 13:45 

 

[facility] COMPARISON:     

No previous studies available for comparison.

 

                     

INDICATIONS :     

Left ankle pain and swelling. 

                     

 

MEDICAL HISTORY :            

Cellulitis. Renal calculi. Hypertension. Cerebral palsy.   

 

SURGICAL HISTORY :     

None.   

 

ENCOUNTER:     

Initial                                        

 

ACUITY:     

1 day      

 

PAIN SCORE:     

8/10

 

LOCATION:     

Left  ankle. 

 

FINDINGS:     

The ankle is aligned. No fracture is seen. There is soft tissue swelling being most prominent lateral
ly.

 

CONCLUSION:     

Prominent soft tissue swelling.

 

 

 

 Maximo Whitney MD on December 31, 2017 at 14:14           

Board Certified Radiologist.

 This report was verified electronically.

## 2017-12-31 NOTE — HHI.HP
__________________________________________________





[facility]


Service


[facility]


Primary Care Physician


No Primary Care Physician


Admission Diagnosis





left leg cellulitis, worse from previous, non compliant


Diagnoses:  


Travel History


International Travel<30 Days:  No


Contact w/Intl Traveler <30 Da:  No


Traveled to Known Affected Are:  No


History of Present Illness


History from patient, ER PA communication, and review of medical records.


Patient was recently discharged from our hospital on 2017.


He was managed from 2017 to 2017 here for left lower 

extremity cellulitis.  He was discharged home with home health care and orders 

to continue Augmentin as outpatient.





Patient reported that today, while he was sitting on his chair, he fell face 

forward trying to get up from the chair.


He denies any syncopal episodes.


Denies any premonitory symptoms prior to the fall.


However he stated that after the fall, he was not able to get up.  He was quite 

short of breath as well.  He reports of a little bit of nausea after the fall.








Patient reports of diarrhea for at least the past 1 week.  He stated it was 

ever since he came in for cellulitis.  Reports diarrhea was foul-smelling, 

black in color.


Again, patient stated his stool has been black for a long time.  He is not on 

iron pills at home.


He states he lives on his own.


He has not filled his prescriptions for antibiotics.  He states his friend was 

supposed to get it and has not brought him home.


Denies being on blood thinners at home.





Review of Systems


Except as stated in HPI:  all other systems reviewed are Neg





Past Family Social History


Past Medical History


htn


copd/emphysema


not on oxygen


cerebral palsy


Past Surgical History


bilateral knee replaced


Allergies:  


Coded Allergies:  


     cephalexin (Unverified  Allergy, Severe, RASH, 8/15/17)


Family History


mom bowel obstruction


Social History


used to smoke, quit 10days ago 


denies drinking etoh or drug abuse





Physical Exam


Vital Signs





Vital Signs








  Date Time  Temp Pulse Resp B/P (MAP) Pulse Ox O2 Delivery O2 Flow Rate FiO2


 


17 13:44   18  98 Room Air  


 


17 13:24  86 18  99 Room Air  


 


17 13:23 98.4 82 18 146/92 (110) 99 Room Air  








Physical Exam


GENERAL: This is a well-nourished, well-developed patient, in no apparent 

distress.


SKIN: LLE with erythema, warmth from toes all the way up to knees


HEAD: Atraumatic. Normocephalic. No temporal or scalp tenderness.


EYES: No scleral icterus. No injection or drainage. 


ENT: Nose without bleeding, purulent drainage or septal hematomae. Airway 

patent.


NECK: Trachea midline. No JVD . Supple, nontender, no meningeal signs.


CARDIOVASCULAR: Regular rate and rhythm without murmurs, gallops, or rubs. 


RESPIRATORY: Clear to auscultation. Breath sounds equal bilaterally. No wheezes

, rales, or rhonchi.  


GASTROINTESTINAL: Abdomen soft, non-tender, nondistended.  No guarding.


MUSCULOSKELETAL: Extremities without clubbing, cyanosis. LLE shorter than the 

left, left knee with swelling and pain, severe pain on movement 


NEUROLOGICAL: Awake and alert.  Normal speech.


Laboratory





Laboratory Tests








Test


  17


13:36


 


White Blood Count 11.2 


 


Red Blood Count 4.85 


 


Hemoglobin 15.4 


 


Hematocrit 44.8 


 


Mean Corpuscular Volume 92.3 


 


Mean Corpuscular Hemoglobin 31.9 


 


Mean Corpuscular Hemoglobin


Concent 34.5 


 


 


Red Cell Distribution Width 13.6 


 


Platelet Count 414 


 


Mean Platelet Volume 6.9 


 


Neutrophils (%) (Auto) 67.3 


 


Lymphocytes (%) (Auto) 24.7 


 


Monocytes (%) (Auto) 7.4 


 


Eosinophils (%) (Auto) 0.3 


 


Basophils (%) (Auto) 0.3 


 


Neutrophils # (Auto) 7.5 


 


Lymphocytes # (Auto) 2.8 


 


Monocytes # (Auto) 0.8 


 


Eosinophils # (Auto) 0.0 


 


Basophils # (Auto) 0.0 


 


CBC Comment DIFF FINAL 


 


Differential Comment  


 


Blood Urea Nitrogen 18 


 


Creatinine 1.21 


 


Random Glucose 96 


 


Calcium Level 9.1 


 


Magnesium Level 2.4 


 


Sodium Level 135 


 


Potassium Level 4.6 


 


Chloride Level 99 


 


Carbon Dioxide Level 32.6 


 


Anion Gap 3 


 


Estimat Glomerular Filtration


Rate 61 


 


 


Lactic Acid Level 1.3 


 


C-Reactive Protein 5.36 














 Date/Time


Source Procedure


Growth Status


 


 


 17 13:36


Blood Peripheral Aerobic Blood Culture


Pending Received


 


 17 13:36


Blood Peripheral Anaerobic Blood Culture


Pending Received








Result Diagram:  


17 1336                                                                  

              17 1336





Imaging





Last 48 hours Impressions








Tibia/Fibula X-Ray 17 1330 Signed





Impressions: 





 Service Date/Time:  2017 13:44 - CONCLUSION:  No acute 

bony 





 abnormality is seen.     Maximo Whitney MD 


 


Ankle X-Ray 17 1330 Signed





Impressions: 





 Service Date/Time:  2017 13:45 - CONCLUSION:  Prominent 





 soft tissue swelling.     Maximo Whitney MD 


 


Knee X-Ray 17 0000 Signed





Impressions: 





 Service Date/Time:  2017 14:49 - CONCLUSION: Intact total 





 hip prosthesis for technique.     CAPO Fermin MD 











Caparben VTE Risk Assessment


Caprini VTE Risk Assessment:  Mod/High Risk (score >= 2)


Caprini Risk Assessment Model











 Point Value = 1          Point Value = 2  Point Value = 3  Point Value = 5


 


Age 41-60


Minor surgery


BMI > 25 kg/m2


Swollen legs


Varicose veins


Pregnancy or postpartum


History of unexplained or recurrent


   spontaneous 


Oral contraceptives or hormone


   replacement


Sepsis (< 1 month)


Serious lung disease, including


   pneumonia (< 1 month)


Abnormal pulmonary function


Acute myocardial infarction


Congestive heart failure (< 1 month)


History of inflammatory bowel disease


Medical patient at bed rest Age 61-74


Arthroscopic surgery


Major open surgery (> 45 min)


Laparoscopic surgery (> 45 min)


Malignancy


Confined to bed (> 72 hours)


Immobilizing plaster cast


Central venous access Age >= 75


History of VTE


Family history of VTE


Factor V Leiden


Prothrombin 17979M


Lupus anticoagulant


Anticardiolipin antibodies


Elevated serum homocysteine


Heparin-induced thrombocytopenia


Other congenital or acquired


   thrombophilia Stroke (< 1 month)


Elective arthroplasty


Hip, pelvis, or leg fracture


Acute spinal cord injury (< 1 month)








Prophylaxis Regimen











   Total Risk


Factor Score Risk Level Prophylaxis Regimen


 


0-1      Low Early ambulation


 


2 Moderate Order ONE of the following:


*Sequential Compression Device (SCD)


*Heparin 5000 units SQ BID


 


3-4 Higher Order ONE of the following medications:


*Heparin 5000 units SQ TID


*Enoxaparin/Lovenox 40 mg SQ daily (WT < 150 kg, CrCl > 30 mL/min)


*Enoxaparin/Lovenox 30 mg SQ daily (WT < 150 kg, CrCl > 10-29 mL/min)


*Enoxaparin/Lovenox 30 mg SQ BID (WT < 150 kg, CrCl > 30 mL/min)


AND/OR


*Sequential Compression Device (SCD)


 


5 or more Highest Order ONE of the following medications:


*Heparin 5000 units SQ TID (Preferred with Epidurals)


*Enoxaparin/Lovenox 40 mg SQ daily (WT < 150 kg, CrCl > 30 mL/min)


*Enoxaparin/Lovenox 30 mg SQ daily (WT < 150 kg, CrCl > 10-29 mL/min)


*Enoxaparin/Lovenox 30 mg SQ BID (WT < 150 kg, CrCl > 30 mL/min)


AND


*Sequential Compression Device (SCD)











Assessment and Plan


Assessment and Plan


Impression:





s/p fall 


severe left LE pain on movement post fall 


left LE cellulitis with worsening due to lack of antibiotics for past 2 days 


groin cellulitis/ candida infecton








HTN- not on meds at home


Bipolar disorder 


cerbral palsy.  Reports that he has chronically shorter left lower extremity 

secondary to cerebral palsy.








Plan:





Pain control.


Patient was given Unasyn in ER.


Continue Unasyn every 8 hours


CT Left knee to evaluate further 


ct home meds for bipolar dx


US of LE to r/o DVT - LLE significantly bigger





case management consult for discharge planning 


unsafe discharge to home as pt lives alone, cannot ambulate due to fall and pain

, and underlying cerebral palsy


will need rehab placement at the minimum


informed pt of this





dvt prophylaxis with lovenox


Discussed Condition With


patient, ER PA, nursing staff











Arline Rivera MD Dec 31, 2017 15:58

## 2017-12-31 NOTE — RADRPT
EXAM DATE/TIME:  12/31/2017 16:12 

 

[facility] COMPARISON:     

No previous studies available for comparison.

        

 

 

INDICATIONS :                

Bilateral leg swelling. 

            

 

MEDICAL HISTORY :     

Hypercholesterolemia. Hypertension. Gastroesophageal reflux disease. Cerebral palsy. Encephalitis. Di
verticulitis. Kidney stones. Arthritis. Anxiety. Depression. Bipolar disorder. 

 

SURGICAL HISTORY :     

Tonsillectomy. Bilateral knee replacements. Left ankle surgery. 

 

ENCOUNTER:     

Subsequent

 

ACUITY:     

1 month

 

PAIN SCORE:      

4/10

 

LOCATION:      

Bilateral  legs. 

                       

 

TECHNIQUE:     

Venous ultrasound of the left and right leg was performed from the inguinal ligament to the proximal 
calf.  Real-time, color Doppler and spectral tracing, compression and augmentation techniques were us
ed.  

 

FINDINGS:     

 

RIGHT LEG:     

There is normal compressibility of the deep venous system from the inguinal region to the proximal ca
lf.  No echogenic clot is seen in the lumen of the common femoral, femoral, popliteal, and posterior 
tibial veins.  There is a normal response of the venous system to proximal and distal augmentation an
d respiration.  

 

LEFT LEG:     

There is normal compressibility of the deep venous system from the inguinal region to the proximal ca
lf.  No echogenic clot is seen in the lumen of the common femoral, femoral, popliteal, and posterior 
tibial veins.  There is a normal response of the venous system to proximal and distal augmentation an
d respiration.  

 

CONCLUSION:     

No DVT and there are lymph nodes in the groin bilaterally the largest one on the left measures 3.5 cm
 in size.

 

 

 

 CAPO Fermin MD on December 31, 2017 at 17:05           

Board Certified Radiologist.

 This report was verified electronically.

## 2018-01-01 VITALS
TEMPERATURE: 98.3 F | DIASTOLIC BLOOD PRESSURE: 78 MMHG | OXYGEN SATURATION: 96 % | HEART RATE: 86 BPM | RESPIRATION RATE: 20 BRPM | SYSTOLIC BLOOD PRESSURE: 161 MMHG

## 2018-01-01 VITALS
SYSTOLIC BLOOD PRESSURE: 150 MMHG | HEART RATE: 72 BPM | RESPIRATION RATE: 18 BRPM | TEMPERATURE: 98.5 F | OXYGEN SATURATION: 98 % | DIASTOLIC BLOOD PRESSURE: 72 MMHG

## 2018-01-01 VITALS
RESPIRATION RATE: 18 BRPM | HEART RATE: 78 BPM | DIASTOLIC BLOOD PRESSURE: 68 MMHG | OXYGEN SATURATION: 97 % | SYSTOLIC BLOOD PRESSURE: 148 MMHG | TEMPERATURE: 97.8 F

## 2018-01-01 VITALS
SYSTOLIC BLOOD PRESSURE: 134 MMHG | TEMPERATURE: 98 F | DIASTOLIC BLOOD PRESSURE: 72 MMHG | HEART RATE: 89 BPM | RESPIRATION RATE: 20 BRPM | OXYGEN SATURATION: 95 %

## 2018-01-01 VITALS
HEART RATE: 87 BPM | SYSTOLIC BLOOD PRESSURE: 124 MMHG | RESPIRATION RATE: 18 BRPM | DIASTOLIC BLOOD PRESSURE: 68 MMHG | OXYGEN SATURATION: 93 % | TEMPERATURE: 98.4 F

## 2018-01-01 VITALS — HEART RATE: 90 BPM

## 2018-01-01 LAB
BASOPHILS # BLD AUTO: 0 TH/MM3 (ref 0–0.2)
BASOPHILS NFR BLD: 0.3 % (ref 0–2)
BUN SERPL-MCNC: 15 MG/DL (ref 7–18)
CALCIUM SERPL-MCNC: 8 MG/DL (ref 8.5–10.1)
CHLORIDE SERPL-SCNC: 102 MEQ/L (ref 98–107)
CREAT SERPL-MCNC: 1.03 MG/DL (ref 0.6–1.3)
EOSINOPHIL # BLD: 0 TH/MM3 (ref 0–0.4)
EOSINOPHIL NFR BLD: 0.3 % (ref 0–4)
ERYTHROCYTE [DISTWIDTH] IN BLOOD BY AUTOMATED COUNT: 13.3 % (ref 11.6–17.2)
GFR SERPLBLD BASED ON 1.73 SQ M-ARVRAT: 73 ML/MIN (ref 89–?)
GLUCOSE SERPL-MCNC: 102 MG/DL (ref 74–106)
HCO3 BLD-SCNC: 28 MEQ/L (ref 21–32)
HCT VFR BLD CALC: 38.7 % (ref 39–51)
HGB BLD-MCNC: 13.2 GM/DL (ref 13–17)
LYMPHOCYTES # BLD AUTO: 2.4 TH/MM3 (ref 1–4.8)
LYMPHOCYTES NFR BLD AUTO: 24.3 % (ref 9–44)
MCH RBC QN AUTO: 31.9 PG (ref 27–34)
MCHC RBC AUTO-ENTMCNC: 34.2 % (ref 32–36)
MCV RBC AUTO: 93.3 FL (ref 80–100)
MONOCYTE #: 0.7 TH/MM3 (ref 0–0.9)
MONOCYTES NFR BLD: 6.8 % (ref 0–8)
NEUTROPHILS # BLD AUTO: 6.7 TH/MM3 (ref 1.8–7.7)
NEUTROPHILS NFR BLD AUTO: 68.3 % (ref 16–70)
PLATELET # BLD: 373 TH/MM3 (ref 150–450)
PMV BLD AUTO: 7 FL (ref 7–11)
RBC # BLD AUTO: 4.14 MIL/MM3 (ref 4.5–5.9)
SODIUM SERPL-SCNC: 138 MEQ/L (ref 136–145)
WBC # BLD AUTO: 9.8 TH/MM3 (ref 4–11)

## 2018-01-01 PROCEDURE — 0SWDXJZ REVISION OF SYNTHETIC SUBSTITUTE IN LEFT KNEE JOINT, EXTERNAL APPROACH: ICD-10-PCS | Performed by: ORTHOPAEDIC SURGERY

## 2018-01-01 RX ADMIN — SODIUM CHLORIDE SCH MLS/HR: 900 INJECTION INTRAVENOUS at 03:20

## 2018-01-01 RX ADMIN — BUPROPION HYDROCHLORIDE SCH MG: 150 TABLET, FILM COATED ORAL at 21:52

## 2018-01-01 RX ADMIN — Medication SCH ML: at 21:52

## 2018-01-01 RX ADMIN — SULFAMETHOXAZOLE AND TRIMETHOPRIM SCH TAB: 800; 160 TABLET ORAL at 21:52

## 2018-01-01 RX ADMIN — ENOXAPARIN SODIUM SCH MG: 40 INJECTION SUBCUTANEOUS at 09:00

## 2018-01-01 RX ADMIN — SODIUM CHLORIDE SCH MLS/HR: 900 INJECTION INTRAVENOUS at 08:47

## 2018-01-01 RX ADMIN — NYSTATIN SCH APPLIC: 100000 POWDER TOPICAL at 05:49

## 2018-01-01 RX ADMIN — HYDROCODONE BITARTRATE AND ACETAMINOPHEN PRN TAB: 5; 325 TABLET ORAL at 13:20

## 2018-01-01 RX ADMIN — HYDROCODONE BITARTRATE AND ACETAMINOPHEN PRN TAB: 5; 325 TABLET ORAL at 08:56

## 2018-01-01 RX ADMIN — NYSTATIN SCH APPLIC: 100000 POWDER TOPICAL at 13:20

## 2018-01-01 RX ADMIN — ZIPRASIDONE HYDROCHLORIDE SCH MG: 60 CAPSULE ORAL at 21:52

## 2018-01-01 RX ADMIN — Medication SCH TAB: at 18:34

## 2018-01-01 RX ADMIN — Medication SCH ML: at 08:47

## 2018-01-01 NOTE — PD.CONS
cc:   Miguel Guajardo MD


__________________________________________________





HPI


Service


Orthopedic Surgeons


Consult Requested By


ED staff


Reason for Consult


Left knee subluxation


Primary Care Physician


No Primary Care Physician


Admission Diagnosis





left leg cellulitis, worse from previous, non compliant


Diagnoses:  


(1) Subluxation of left knee


(2) Obesity


(3) Renal insufficiency


(4) Cerebral palsy


(5) Cellulitis


(6) Gastroesophageal reflux disease


(7) Hypertension


(8) fungal infection


Chief Complaint:  


Left knee pain


History of Present Illness


This 62-year-old male currently is being treated for cellulitis of the left 

lower extremity. The patient did have a fall prior to his admission.  He was 

discharged recently and was prescribed antibiotics which she did not fill.  He 

has had a persistence of a cellulitis.  He was complaining of left knee pain 

and an x-ray revealed a subluxation.  He is status post a total knee 

arthroplasty by Dr. Gordon.  The patient states that this has happened on 

several other occasions which required a reduction with sedation.





Review of Systems


Reviewed and well outlined in the medical record





Past Family Social History


Past Medical History


htn


copd/emphysema


not on oxygen


cerebral palsy


Past Surgical History


bilateral knee replaced


Allergies:  


Coded Allergies:  


     cephalexin (Unverified  Allergy, Severe, RASH, 8/15/17)


Active Ordered Medications





Current Medications








 Medications


  (Trade)  Dose


 Ordered  Sig/Amish


 Route  Start Time


 Stop Time Status Last Admin


 


  (NS Flush)  2 ml  UNSCH  PRN


 IV FLUSH  12/31/17 15:45


     


 


 


  (NS Flush)  2 ml  BID


 IV FLUSH  12/31/17 21:00


    1/1/18 08:47


 


 


  (Narcan Inj)  0.4 mg  UNSCH  PRN


 IV PUSH  12/31/17 15:45


     


 


 


  (Wellbutrin Sr)  150 mg  HS


 PO  12/31/17 21:00


    12/31/17 21:05


 


 


  (Vistaril)  50 mg  HS


 PO  12/31/17 21:00


    12/31/17 21:05


 


 


  (Geodon)  120 mg  HS


 PO  12/31/17 21:00


    12/31/17 21:04


 


 


  (Norco  5-325 Mg)  1 tab  Q4H  PRN


 PO  12/31/17 16:00


    1/1/18 13:20


 


 


  (Mycostatin


 Powder)  1 applic  Q8HR


 TOPICAL  12/31/17 22:00


    1/1/18 13:20


 


 


  (Dilaudid Pf Inj)  1 mg  Q4H  PRN


 IV PUSH  12/31/17 20:00


     


 


 


  (Lovenox Inj)  40 mg  Q24H


 SQ  1/1/18 09:00


    1/1/18 09:00


 


 


  (Lactinex)  1 tab  TID


 PO  1/1/18 18:00


     


 


 


  (Bactrim Ds


 800-160 Mg)  1 tab  Q12HR


 PO  1/1/18 21:00


     


 


 


  (Mycostatin Oint)  1 applic  Q12HR


 TOPICAL  1/1/18 21:00


     


 








Reported Meds & Active Scripts


Active


Lactobacillus Acidophilus 1 Billion Cell Tab 1 Tab PO TIDAC


Bactrim DS (Sulfamethoxazole-Trimethoprim) 800-160 Mg Tab 1 Tab PO BID


Tramadol (Tramadol HCl) 50 Mg Tab 50 Mg PO Q6H PRN


Reported


Hydroxyzine Pamoate 50 Mg Cap 50 Mg PO HS


Bupropion HCl 100 Mg Tab 150 Mg PO HS


Ziprasidone 60 Mg Cap 120 Mg PO HS


Family History


mom bowel obstruction


Social History


used to smoke, quit 10days ago 


denies drinking etoh or drug abuse





Physical Exam


Vital Signs





Vital Signs








  Date Time  Temp Pulse Resp B/P (MAP) Pulse Ox O2 Delivery O2 Flow Rate FiO2


 


1/1/18 14:36   18     


 


1/1/18 11:48 98.3 86 20 161/78 (105) 96   


 


1/1/18 07:51 98.0 89 20 134/72 (92) 95   


 


1/1/18 04:05  90      


 


1/1/18 03:36 98.4 87 18 124/68 (86) 93   


 


12/31/17 23:59  91      


 


12/31/17 23:27 98.3 88 18 132/68 (89) 93   


 


12/31/17 20:28 98.8 98 20 124/61 (82) 94   








Physical Exam


He has bilateral lower extremity edema and areas of patchy erythema.  His left 

knee is held in a flexed position.  There is mild swelling.  He has a well-

healed surgical incision over the anterior aspect.  He has pain with any 

attempt at range of motion and is unable to extend it.  There are no obvious 

focal neurological deficits distally.  He has good capillary refill and 

sensation.


Laboratory





Laboratory Tests








Test


  1/1/18


03:32


 


White Blood Count 9.8 


 


Red Blood Count 4.14 


 


Hemoglobin 13.2 


 


Hematocrit 38.7 


 


Mean Corpuscular Volume 93.3 


 


Mean Corpuscular Hemoglobin 31.9 


 


Mean Corpuscular Hemoglobin


Concent 34.2 


 


 


Red Cell Distribution Width 13.3 


 


Platelet Count 373 


 


Mean Platelet Volume 7.0 


 


Neutrophils (%) (Auto) 68.3 


 


Lymphocytes (%) (Auto) 24.3 


 


Monocytes (%) (Auto) 6.8 


 


Eosinophils (%) (Auto) 0.3 


 


Basophils (%) (Auto) 0.3 


 


Neutrophils # (Auto) 6.7 


 


Lymphocytes # (Auto) 2.4 


 


Monocytes # (Auto) 0.7 


 


Eosinophils # (Auto) 0.0 


 


Basophils # (Auto) 0.0 


 


CBC Comment DIFF FINAL 


 


Differential Comment  


 


Blood Urea Nitrogen 15 


 


Creatinine 1.03 


 


Random Glucose 102 


 


Calcium Level 8.0 


 


Sodium Level 138 


 


Potassium Level 4.1 


 


Chloride Level 102 


 


Carbon Dioxide Level 28.0 


 


Anion Gap 8 


 


Estimat Glomerular Filtration


Rate 73 


 














 Date/Time


Source Procedure


Growth Status


 


 


 12/31/17 13:36


Blood Peripheral Aerobic Blood Culture - Preliminary


NO GROWTH IN 1 DAY Resulted


 


 12/31/17 13:36


Blood Peripheral Anaerobic Blood Culture - Preliminary


NO GROWTH IN 1 DAY Resulted








Result Diagram:  


1/1/18 0332                                                                    

            1/1/18 0332





Imaging





Last 72 hours Impressions








Tibia/Fibula X-Ray 12/31/17 1330 Signed





Impressions: 





 Service Date/Time:  Sunday, December 31, 2017 13:44 - CONCLUSION:  No acute 

bony 





 abnormality is seen.     Maximo Whitney MD 


 


Ankle X-Ray 12/31/17 1330 Signed





Impressions: 





 Service Date/Time:  Sunday, December 31, 2017 13:45 - CONCLUSION:  Prominent 





 soft tissue swelling.     Maximo Whitney MD 


 


Lower Extremity Ultrasound 12/31/17 0000 Signed





Impressions: 





 Service Date/Time:  Sunday, December 31, 2017 16:12 - CONCLUSION:  No DVT and 





 there are lymph nodes in the groin bilaterally the largest one on the left 





 measures 3.5 cm in size.     CAPO Fermin MD 


 


Lower Extremity CT 12/31/17 0000 Signed





Impressions: 





 Service Date/Time:  Sunday, December 31, 2017 16:56 - CONCLUSION:  Dislocation 





 of the femoral and tibial components with tibial dislocated posteriorly.     

CAPO Fermin MD 


 


Knee X-Ray 12/31/17 0000 Signed





Impressions: 





 Service Date/Time:  Sunday, December 31, 2017 14:49 - CONCLUSION: Intact total 





 hip prosthesis for technique.     CAPO Fermin MD ADDENDUM:  There is a 





 total knee prosthesis in place. There is posterior subluxation displacement of 





 the tibia and tibial component of the total knee prosthesis. This is best seen 





 on the lateral view.    Maximo Whitney MD 





 











Assessment & Plan


Problem List:  


(1) Dislocation of prosthetic knee joint


ICD Codes:  T84.028A - Dislocation of other internal joint prosthesis, initial 

encounter; Z96.659 - Presence of unspecified artificial knee joint


(2) Obesity


ICD Codes:  E66.9 - Obesity, unspecified


Status:  Acute


(3) Cerebral palsy


ICD Codes:  G80.9 - Cerebral palsy, unspecified


Status:  Chronic


(4) Cellulitis


ICD Codes:  L03.90 - Cellulitis, unspecified


Status:  Acute


Qualifiers:  


   Qualified Codes:  L03.116 - Cellulitis of left lower limb


Assessment and Plan


The findings were discussed.  His x-rays were reviewed.  The patient had a 

posterior stabilized total knee arthroplasty which has apparently dislocated 

with the tibial component posterior.  He states this has happened before 

requiring closed reduction.  An attempt will be made for reduction under 

sedation in the emergency room setting.  If this is successful, the patient can 

be discharged and will follow-up with his treating surgeon, Dr. Gordon.  If 

unsuccessful under sedation he may require reduction under general anesthesia.  

The nature of the procedure, the risks, expected benefits, as well as the 

postoperative expectations were discussed with him.  He acknowledges full 

understanding and agrees to it.











Miguel Guajardo MD Jan 1, 2018 15:40

## 2018-01-01 NOTE — PD.OP
cc:   Miguel Guajardo MD


__________________________________________________





Operative Report


Date of Surgery:  Jan 1, 2018


Preoperative Diagnosis:  


(1) Dislocation of prosthetic knee joint


Postoperative Diagnosis:  


(1) Dislocation of prosthetic knee joint


Procedure:


Closed reduction dislocated left total knee arthroplasty


Anesthesia:


Propofol sedation


Surgeon:


Miguel Guajardo


Assistant(s):


ED staff


Operation and Findings:


Indications: This 62-year-old male who has a history of a total knee 

arthroplasty many years ago has had recurrent instability and states he has 

required closed manipulation.  He has been treated recently for cellulitis of 

the left lower extremity.  He did have a fall and has been unable to extend his 

knee.  An x-ray revealed posterior subluxation of the tibia.  He has a 

posterior stabilized component which therefore would indicate a dislocated 

total knee arthroplasty.


Procedure and findings: The patient was administered propofol by the emergency 

room physician.  The left knee was locked in flexion.  A closed manipulation of 

the knee was then completed with flexion of the knee followed by anterior 

stress on the tibia. There was an audible clunk at which time for extension was 

easily obtainable.  Post reduction x-rays are pending at the time of this 

dictation. The patient tolerated procedure well.  He will be placed into a knee 

immobilizer and will follow with his treating surgeon upon discharge.











Miguel Guajardo MD Jan 1, 2018 16:43

## 2018-01-01 NOTE — HHI.PR
Subjective


Remarks


Guevara complaint when I see this patient today is left knee pain with 

difficulty extending the knee.  Patient following his knee and a flexed 

position.  Review of imaging is suggestive of subluxation.  Comes reviewed with 

radiology and they agree with this.  Consideration for discharge deferred until 

subluxation is addressed.





Objective





Vital Signs








  Date Time  Temp Pulse Resp B/P (MAP) Pulse Ox O2 Delivery O2 Flow Rate FiO2


 


1/1/18 11:48 98.3 86 20 161/78 (105) 96   


 


1/1/18 10:01   20     


 


1/1/18 07:51 98.0 89 20 134/72 (92) 95   


 


1/1/18 04:05  90      


 


1/1/18 03:36 98.4 87 18 124/68 (86) 93   


 


12/31/17 23:59  91      


 


12/31/17 23:27 98.3 88 18 132/68 (89) 93   


 


12/31/17 20:28 98.8 98 20 124/61 (82) 94   














I/O      


 


 12/31/17 12/31/17 12/31/17 1/1/18 1/1/18 1/1/18





 07:00 15:00 23:00 07:00 15:00 23:00


 


Intake Total   100 ml 100 ml  


 


Balance   100 ml 100 ml  


 


      


 


Intake IV Total   100 ml 100 ml  








Result Diagram:  


1/1/18 0332 1/1/18 0332





Objective Remarks


GENERAL: NAD, A&Ox3


HEAD: Normocephalic. 


NECK: Supple, trachea midline. No lymphadenopathy.


EYES: No scleral icterus. No injection or drainage. 


CARDIOVASCULAR: Regular rate and rhythm without murmurs, gallops, or rubs. 


RESPIRATORY: Breath sounds equal bilaterally. No accessory muscle use.


GASTROINTESTINAL: Abdomen soft, non-tender, nondistended. 


MUSCULOSKELETAL: No cyanosis, or edema.  Tender at left knee with any range of 

motion, left knee flexed and unable to extend without pain


SKIN: Warm and dry.  Erythematous left foot and ankle


NEURO:  No focal neurological deficitis.





A/P


Problem List:  


(1) Subluxation of left knee


ICD Code:  S83.102A - Unspecified subluxation of left knee, initial encounter


(2) Cellulitis


ICD Code:  L03.90 - Cellulitis, unspecified


Status:  Acute


Assessment and Plan


62-year-old male admitted secondary to left foot and ankle cellulitis with 

outpatient treatment failure.  Subluxation of left knee is present secondary to 

fall.





s/p fall 


Left knee subluxation


Orthopedic consult


Treat pain as needed


Due to frequency of falls and risk for injury, we'll consider skilled nursing 

facility at discharge





Left ankle and foot cellulitis


Outpatient treatment failure


Patient did not obtain Augmentin as prescribed


Bactrim


Monitor clinically for improvement





groin candida infecton


Topical nystatin





HTN


Continue baseline treatments


Follow blood pressures





Bipolar disorder 


cerbral palsy


Supportive care


Change to baseline treatments





DVT prophylaxis


Lovenox





Problem Qualifiers





(1) Cellulitis:  


Qualified Codes:  L03.116 - Cellulitis of left lower limb








Jose Payan MD Jan 1, 2018 14:27

## 2018-01-01 NOTE — RADRPT
EXAM DATE/TIME:  01/01/2018 17:03 

 

[facility] COMPARISON:     

KNEE LEFT COMPLETE (4VWS), December 31, 2017, 14:49.

 

                     

INDICATIONS :     

Post reduction left knee.

                     

 

MEDICAL HISTORY :            

Hypertension. Cerebral palsy.   

 

SURGICAL HISTORY :        

Bilateral knee replacement

 

ENCOUNTER:     

Subsequent                                        

 

ACUITY:     

2 days      

 

PAIN SCORE:     

5/10

 

LOCATION:     

Left  Knee.

 

FINDINGS:     

Previously seen dislocation has been reduced. The alignment is anatomical at this time.

 

CONCLUSION:     

Reduction of previously seen femoral tibial prosthatic dislocation.

 

 

 

 CAPO Fermin MD on January 01, 2018 at 17:27           

Board Certified Radiologist.

 This report was verified electronically.

## 2018-01-02 VITALS
HEART RATE: 74 BPM | SYSTOLIC BLOOD PRESSURE: 154 MMHG | TEMPERATURE: 98 F | RESPIRATION RATE: 18 BRPM | DIASTOLIC BLOOD PRESSURE: 79 MMHG | OXYGEN SATURATION: 96 %

## 2018-01-02 VITALS
OXYGEN SATURATION: 96 % | SYSTOLIC BLOOD PRESSURE: 136 MMHG | TEMPERATURE: 98.6 F | HEART RATE: 95 BPM | DIASTOLIC BLOOD PRESSURE: 80 MMHG | RESPIRATION RATE: 22 BRPM

## 2018-01-02 RX ADMIN — SULFAMETHOXAZOLE AND TRIMETHOPRIM SCH TAB: 800; 160 TABLET ORAL at 10:12

## 2018-01-02 RX ADMIN — Medication SCH ML: at 10:11

## 2018-01-02 RX ADMIN — NYSTATIN SCH APPLIC: 100000 POWDER TOPICAL at 03:22

## 2018-01-02 RX ADMIN — ENOXAPARIN SODIUM SCH MG: 40 INJECTION SUBCUTANEOUS at 10:14

## 2018-01-02 RX ADMIN — NYSTATIN SCH APPLIC: 100000 POWDER TOPICAL at 06:00

## 2018-01-02 RX ADMIN — Medication SCH TAB: at 10:12

## 2018-01-02 NOTE — HHI.DS
__________________________________________________





Discharge Summary


Admission Date


Jan 1, 2018 at 14:20


Discharge Date:  Jan 2, 2018


Admitting Diagnosis





left leg cellulitis, worse from previous, non compliant





(1) Subluxation of left knee


ICD Code:  S83.102A - Unspecified subluxation of left knee, initial encounter


Diagnosis:  Principal





(2) Obesity


ICD Code:  E66.9 - Obesity, unspecified


Diagnosis:  Secondary


Status:  Acute


(3) Renal insufficiency


ICD Code:  N28.9 - Disorder of kidney and ureter, unspecified


Diagnosis:  Secondary





(4) Cerebral palsy


ICD Code:  G80.9 - Cerebral palsy, unspecified


Diagnosis:  Secondary


Status:  Chronic


(5) Cellulitis


ICD Code:  L03.90 - Cellulitis, unspecified


Diagnosis:  Principal


Status:  Acute


(6) Gastroesophageal reflux disease


Diagnosis:  Secondary





(7) Hypertension


Diagnosis:  Secondary





(8) fungal infection


Diagnosis:  Secondary


Status:  Chronic


Procedures


Reduction of left knee subluxation


Brief History - From Admission


History from patient, ER PA communication, and review of medical records.


Patient was recently discharged from our hospital on December 29, 2017.


He was managed from December 22, 2017 to December 29, 2017 here for left lower 

extremity cellulitis.  He was discharged home with home health care and orders 

to continue Augmentin as outpatient.





Patient reported that today, while he was sitting on his chair, he fell face 

forward trying to get up from the chair.


He denies any syncopal episodes.


Denies any premonitory symptoms prior to the fall.


However he stated that after the fall, he was not able to get up.  He was quite 

short of breath as well.  He reports of a little bit of nausea after the fall.








Patient reports of diarrhea for at least the past 1 week.  He stated it was 

ever since he came in for cellulitis.  Reports diarrhea was foul-smelling, 

black in color.


Again, patient stated his stool has been black for a long time.  He is not on 

iron pills at home.


He states he lives on his own.


He has not filled his prescriptions for antibiotics.  He states his friend was 

supposed to get it and has not brought him home.


Denies being on blood thinners at home.


CBC/BMP:  


1/1/18 0332                                                                    

            1/1/18 0332





Significant Findings





Laboratory Tests








Test


  12/31/17


13:36 1/1/18


03:32


 


White Blood Count


  11.2 TH/MM3


(4.0-11.0) 


 


 


Mean Platelet Volume


  6.9 FL


(7.0-11.0) 


 


 


Sodium Level


  135 MEQ/L


(136-145) 


 


 


Carbon Dioxide Level


  32.6 MEQ/L


(21.0-32.0) 


 


 


Anion Gap 3 MEQ/L (5-15)  


 


Estimat Glomerular Filtration


Rate 61 ML/MIN


(>89) 73 ML/MIN


(>89)


 


C-Reactive Protein


  5.36 MG/DL


(0.00-0.30) 


 


 


Red Blood Count


  


  4.14 MIL/MM3


(4.50-5.90)


 


Hematocrit


  


  38.7 %


(39.0-51.0)


 


Calcium Level


  


  8.0 MG/DL


(8.5-10.1)








Hospital Course


Mr. Cuellar is a 62-year-old male.  He was admitted secondary to falls at home 

and complaining of left knee pain.  He also complained worsening of the left 

lower extremity cellulitis which she had not started oral antibiotics for home.

  Antibiotic treatments and close monitoring are needed.  Patient's also having 

difficulty walking, demonstrated by falls at home.  His left knee was subluxed 

and he had a reduction of the subluxation performed in the ER today.  The knee 

is now in anatomical position and patient has a knee brace in place.  Continue 

physical therapy as needed both for recovery from his subluxation and to 

improve his ambulation prior to return to home so he does not fall again.  

Medically stable for transfer to skilled nursing facility today.


Pt Condition on Discharge:  Stable


Discharge Disposition:  Discharge to SNF


Discharge Time:  > 30 minutes


Discharge Instructions


DIET: Follow Instructions for:  As Tolerated, No Restrictions


Activities you can perform:  Regular-No Restrictions


Follow up Referrals:  


PCP Follow-up - 2 Weeks





New Medications:  


Lactobacillus Acidophilus (Lactobacillus Acidophilus) 1 Billion Cell Tab


1 TAB PO TIDAC for Nutritional Supplement, #45 TAB 0 Refills





Sulfamethoxazole-Trimethoprim (Bactrim DS) 800-160 Mg Tab


1 TAB PO BID for Infection, #20 TAB 0 Refills





Tramadol (Tramadol) 50 Mg Tab


50 MG PO Q6H PRN for PAIN, #40 TAB 0 Refills





 


Continued Medications:  


Bupropion HCl (Bupropion HCl) 100 Mg Tab


150 MG PO HS for Control Depression, TAB 0 Refills





Hydroxyzine Pamoate (Hydroxyzine Pamoate) 50 Mg Cap


50 MG PO HS, CAP 0 Refills





Ziprasidone (Ziprasidone) 60 Mg Cap


120 MG PO HS, #60 CAP 0 Refills





 


Discontinued Medications:  


Amoxicillin-Clavulanate (Augmentin) 875-125 Mg Tab


1 TAB PO BID for Infection, #20 TAB 0 Refills

















Jose Payan MD Jan 2, 2018 10:04

## 2018-04-15 ENCOUNTER — HOSPITAL ENCOUNTER (EMERGENCY)
Dept: HOSPITAL 17 - NEPC | Age: 63
Discharge: HOME | End: 2018-04-15
Payer: MEDICARE

## 2018-04-15 VITALS
TEMPERATURE: 98.4 F | SYSTOLIC BLOOD PRESSURE: 136 MMHG | RESPIRATION RATE: 20 BRPM | OXYGEN SATURATION: 97 % | DIASTOLIC BLOOD PRESSURE: 76 MMHG | HEART RATE: 75 BPM

## 2018-04-15 VITALS — BODY MASS INDEX: 38.53 KG/M2 | WEIGHT: 260.15 LBS | HEIGHT: 69 IN

## 2018-04-15 DIAGNOSIS — I10: ICD-10-CM

## 2018-04-15 DIAGNOSIS — K21.9: ICD-10-CM

## 2018-04-15 DIAGNOSIS — F41.9: ICD-10-CM

## 2018-04-15 DIAGNOSIS — R94.31: ICD-10-CM

## 2018-04-15 DIAGNOSIS — E78.00: ICD-10-CM

## 2018-04-15 DIAGNOSIS — F31.9: ICD-10-CM

## 2018-04-15 DIAGNOSIS — R11.10: Primary | ICD-10-CM

## 2018-04-15 DIAGNOSIS — G80.9: ICD-10-CM

## 2018-04-15 DIAGNOSIS — F17.200: ICD-10-CM

## 2018-04-15 LAB
ALBUMIN SERPL-MCNC: 3.7 GM/DL (ref 3.4–5)
ALP SERPL-CCNC: 78 U/L (ref 45–117)
ALT SERPL-CCNC: 25 U/L (ref 12–78)
AST SERPL-CCNC: 32 U/L (ref 15–37)
BACTERIA #/AREA URNS HPF: (no result) /HPF
BASOPHILS # BLD AUTO: 0 TH/MM3 (ref 0–0.2)
BASOPHILS NFR BLD: 0.2 % (ref 0–2)
BILIRUB SERPL-MCNC: 0.5 MG/DL (ref 0.2–1)
BUN SERPL-MCNC: 13 MG/DL (ref 7–18)
CALCIUM SERPL-MCNC: 9.4 MG/DL (ref 8.5–10.1)
CHLORIDE SERPL-SCNC: 103 MEQ/L (ref 98–107)
COLOR UR: YELLOW
CREAT SERPL-MCNC: 1.24 MG/DL (ref 0.6–1.3)
EOSINOPHIL # BLD: 0 TH/MM3 (ref 0–0.4)
EOSINOPHIL NFR BLD: 0.3 % (ref 0–4)
ERYTHROCYTE [DISTWIDTH] IN BLOOD BY AUTOMATED COUNT: 13 % (ref 11.6–17.2)
GFR SERPLBLD BASED ON 1.73 SQ M-ARVRAT: 59 ML/MIN (ref 89–?)
GLUCOSE SERPL-MCNC: 106 MG/DL (ref 74–106)
GLUCOSE UR STRIP-MCNC: (no result) MG/DL
HCO3 BLD-SCNC: 28.2 MEQ/L (ref 21–32)
HCT VFR BLD CALC: 47.5 % (ref 39–51)
HGB BLD-MCNC: 16.6 GM/DL (ref 13–17)
HGB UR QL STRIP: (no result)
KETONES UR STRIP-MCNC: (no result) MG/DL
LYMPHOCYTES # BLD AUTO: 2.2 TH/MM3 (ref 1–4.8)
LYMPHOCYTES NFR BLD AUTO: 29.4 % (ref 9–44)
MCH RBC QN AUTO: 32.3 PG (ref 27–34)
MCHC RBC AUTO-ENTMCNC: 35 % (ref 32–36)
MCV RBC AUTO: 92.2 FL (ref 80–100)
MONOCYTE #: 0.5 TH/MM3 (ref 0–0.9)
MONOCYTES NFR BLD: 6.2 % (ref 0–8)
MUCOUS THREADS #/AREA URNS LPF: (no result) /LPF
NEUTROPHILS # BLD AUTO: 4.9 TH/MM3 (ref 1.8–7.7)
NEUTROPHILS NFR BLD AUTO: 63.9 % (ref 16–70)
NITRITE UR QL STRIP: (no result)
PLATELET # BLD: 232 TH/MM3 (ref 150–450)
PMV BLD AUTO: 6.8 FL (ref 7–11)
PROT SERPL-MCNC: 7.8 GM/DL (ref 6.4–8.2)
RBC # BLD AUTO: 5.15 MIL/MM3 (ref 4.5–5.9)
SODIUM SERPL-SCNC: 138 MEQ/L (ref 136–145)
SP GR UR STRIP: 1.02 (ref 1–1.03)
URINE LEUKOCYTE ESTERASE: (no result)
WBC # BLD AUTO: 7.6 TH/MM3 (ref 4–11)

## 2018-04-15 PROCEDURE — 80053 COMPREHEN METABOLIC PANEL: CPT

## 2018-04-15 PROCEDURE — 81001 URINALYSIS AUTO W/SCOPE: CPT

## 2018-04-15 PROCEDURE — 99284 EMERGENCY DEPT VISIT MOD MDM: CPT

## 2018-04-15 PROCEDURE — 93005 ELECTROCARDIOGRAM TRACING: CPT

## 2018-04-15 PROCEDURE — 96374 THER/PROPH/DIAG INJ IV PUSH: CPT

## 2018-04-15 PROCEDURE — 83690 ASSAY OF LIPASE: CPT

## 2018-04-15 PROCEDURE — 85025 COMPLETE CBC W/AUTO DIFF WBC: CPT

## 2018-04-15 NOTE — EKG
Date Performed: 04/15/2018       Time Performed: 06:22:23

 

PTAGE:      62 years

 

EKG:      SINUS BRADYCARDIA BORDERLINE ECG Compared to 

 

 PREVIOUS TRACING            , the patient is now bradycardic. PREVIOUS TRACIN2015 20.53

 

DOCTOR:   Jillian Hoyos  Interpretating Date/Time  04/15/2018 15:48:51

## 2018-04-15 NOTE — PD
HPI


Chief Complaint:  GI Complaint


Time Seen by Provider:  03:07


Travel History


International Travel<30 days:  No


Contact w/Intl Traveler<30days:  No


Traveled to known affect area:  No





History of Present Illness


HPI


62-year-old male arrives with complaint of "not feeling good."  He reports 

vomiting multiple times in the last few days.  No fever or diarrhea.  The 

patient reports some chest pain after multiple episodes of dry heaving.  He 

reports similar episodes have occurred previously.  Appetite has been slightly 

decreased.  No new or different medication.  No recent illness the patient can 

recall otherwise.





PFSH


Past Medical History


Arthritis:  Yes


Asthma:  No


Autoimmune Disease:  No


Blood Disorders:  No


Bipolar Disorder:  Yes


Anxiety:  Yes


Depression:  No


Heart Rhythm Problems:  No


Cancer:  No


Cardiovascular Problems:  Yes (HTN )


Cerebral Palsy:  Yes


High Cholesterol:  Yes


Chemotherapy:  No


Chest Pain:  No


Congestive Heart Failure:  No


COPD:  No


Cerebrovascular Accident:  No


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


Diabetes:  No


Diminished Hearing:  No


Diverticulitis:  Yes


Endocrine:  No


Gastrointestinal Disorders:  Yes (DIVERTICULITIS)


GERD:  Yes


Glaucoma:  No


Genitourinary:  No


Headaches:  No


Hepatitis:  No


Hiatal Hernia:  No


Heparin Induced Thrombocytopen:  No


Hypertension:  Yes


Immune Disorder:  No


Implanted Vascular Access Dvce:  Yes (PT STATES "TWO TOTAL KNEE REPLACEMENTS")


Kidney Stones:  Yes (WITH LITHOTRIPSY)


Musculoskeletal:  Yes (CEREBRAL PALSY)


Neurologic:  Yes


Psychiatric:  Yes


Reproductive:  No


Respiratory:  No


Immunizations Current:  Yes


Migraines:  No


Myocardial Infarction:  No


Radiation Therapy:  No


Renal Failure:  No


Seizures:  No


Sickle Cell Disease:  No


Sleep Apnea:  No


Thyroid Disease:  No


Ulcer:  No


PNEUMOCCOCAL Vaccine (Year):  2010





Past Surgical History


Abdominal Surgery:  No


AICD:  No


Appendectomy:  No


Arteriovenous Shunt:  No


Cardiac Surgery:  No


Cholecystectomy:  No


Ear Surgery:  No


Endocrine Surgery:  No


Eye Surgery:  No


Genitourinary Surgery:  No


Gynecologic Surgery:  No


Insulin Pump:  No


Joint Replacement:  Yes (BILATERAL KNEES)


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 

now per patient repor)


Oral Surgery:  No


Pacemaker:  No


Thoracic Surgery:  No


Tonsillectomy:  Yes


Other Surgery:  Yes





Social History


Alcohol Use:  No


Tobacco Use:  Yes (1ppd)


Substance Use:  No





Allergies-Medications


(Allergen,Severity, Reaction):  


Coded Allergies:  


     cephalexin (Unverified  Allergy, Severe, RASH, 4/15/18)


 Keflex


Reported Meds & Prescriptions





Reported Meds & Active Scripts


Active


Zofran Odt (Ondansetron Odt) 4 Mg Tab 4 Mg SL Q8HR PRN


Reported


Hydroxyzine Pamoate 50 Mg Cap 50 Mg PO HS


Bupropion HCl 100 Mg Tab 150 Mg PO HS


Ziprasidone 60 Mg Cap 120 Mg PO HS








Review of Systems


Except as stated in HPI:  all other systems reviewed are Neg


General / Constitutional:  No: Fever





Physical Exam


Narrative


GENERAL: 62-year-old male well-nourished well-developed, BMI is 38





Vital Signs








  Date Time  Temp Pulse Resp B/P (MAP) Pulse Ox O2 Delivery O2 Flow Rate FiO2


 


4/15/18 02:35 98.4 75 20 136/76 (96) 97   








SKIN: Warm and dry.


HEAD: Atraumatic. Normocephalic. 


EYES: Pupils equal and round. No scleral icterus. No injection or drainage. 


ENT: No nasal bleeding or discharge.  Mucous membranes pink and moist.


NECK: Trachea midline. No JVD. 


CARDIOVASCULAR: Regular rate and rhythm.  


RESPIRATORY: No accessory muscle use. Clear to auscultation. Breath sounds 

equal bilaterally. 


GASTROINTESTINAL: Abdomen soft, non-tender, nondistended. Hepatic and splenic 

margins not palpable. 


MUSCULOSKELETAL: Extremities without clubbing, cyanosis, or edema. No obvious 

deformities. 


NEUROLOGICAL: Awake and alert. No obvious cranial nerve deficits.  Motor 

grossly within normal limits. Five out of 5 muscle strength in the arms and 

legs.  Normal speech.


PSYCHIATRIC: Appropriate mood and affect; insight and judgment normal.





Data


Data


Last Documented VS





Vital Signs








  Date Time  Temp Pulse Resp B/P (MAP) Pulse Ox O2 Delivery O2 Flow Rate FiO2


 


4/15/18 02:35 98.4 75 20 136/76 (96) 97   








Orders





 Orders


Complete Blood Count With Diff (4/15/18 03:07)


Comprehensive Metabolic Panel (4/15/18 03:07)


Lipase (4/15/18 03:07)


Urinalysis - C+S If Indicated (4/15/18 03:07)


Iv Access Insert/Monitor (4/15/18 03:07)


Ecg Monitoring (4/15/18 03:07)


Oximetry (4/15/18 03:07)


Ondansetron Inj (Zofran Inj) (4/15/18 03:15)


Sodium Chloride 0.9% Flush (Ns Flush) (4/15/18 03:15)


Electrocardiogram (4/15/18 03:07)


Ed Discharge Order (4/15/18 06:50)





Labs





Laboratory Tests








Test


  4/15/18


03:30 4/15/18


06:30


 


White Blood Count 7.6 TH/MM3  


 


Red Blood Count 5.15 MIL/MM3  


 


Hemoglobin 16.6 GM/DL  


 


Hematocrit 47.5 %  


 


Mean Corpuscular Volume 92.2 FL  


 


Mean Corpuscular Hemoglobin 32.3 PG  


 


Mean Corpuscular Hemoglobin


Concent 35.0 % 


  


 


 


Red Cell Distribution Width 13.0 %  


 


Platelet Count 232 TH/MM3  


 


Mean Platelet Volume 6.8 FL  


 


Neutrophils (%) (Auto) 63.9 %  


 


Lymphocytes (%) (Auto) 29.4 %  


 


Monocytes (%) (Auto) 6.2 %  


 


Eosinophils (%) (Auto) 0.3 %  


 


Basophils (%) (Auto) 0.2 %  


 


Neutrophils # (Auto) 4.9 TH/MM3  


 


Lymphocytes # (Auto) 2.2 TH/MM3  


 


Monocytes # (Auto) 0.5 TH/MM3  


 


Eosinophils # (Auto) 0.0 TH/MM3  


 


Basophils # (Auto) 0.0 TH/MM3  


 


CBC Comment DIFF FINAL  


 


Differential Comment   


 


Blood Urea Nitrogen 13 MG/DL  


 


Creatinine 1.24 MG/DL  


 


Random Glucose 106 MG/DL  


 


Total Protein 7.8 GM/DL  


 


Albumin 3.7 GM/DL  


 


Calcium Level 9.4 MG/DL  


 


Alkaline Phosphatase 78 U/L  


 


Aspartate Amino Transf


(AST/SGOT) 32 U/L 


  


 


 


Alanine Aminotransferase


(ALT/SGPT) 25 U/L 


  


 


 


Total Bilirubin 0.5 MG/DL  


 


Sodium Level 138 MEQ/L  


 


Potassium Level 4.8 MEQ/L  


 


Chloride Level 103 MEQ/L  


 


Carbon Dioxide Level 28.2 MEQ/L  


 


Anion Gap 7 MEQ/L  


 


Estimat Glomerular Filtration


Rate 59 ML/MIN 


  


 


 


Lipase 225 U/L  


 


Urine Color  YELLOW 


 


Urine Turbidity  CLEAR 


 


Urine pH  7.5 


 


Urine Specific Gravity  1.019 


 


Urine Protein  NEG mg/dL 


 


Urine Glucose (UA)  NEG mg/dL 


 


Urine Ketones  NEG mg/dL 


 


Urine Occult Blood  NEG 


 


Urine Nitrite  NEG 


 


Urine Bilirubin  NEG 


 


Urine Urobilinogen


  


  LESS THAN 2.0


MG/DL


 


Urine Leukocyte Esterase  NEG 


 


Urine RBC  2 /hpf 


 


Urine WBC


  


  LESS THAN 1


/hpf


 


Urine Bacteria  RARE /hpf 


 


Urine Mucus  FEW /lpf 


 


Microscopic Urinalysis Comment


  


  CULT NOT


INDICATED











MDM


Medical Decision Making


Medical Screen Exam Complete:  Yes


Emergency Medical Condition:  Yes


Medical Record Reviewed:  Yes


Differential Diagnosis


Constipation, Gastritis, Acute Cholecystitis, Biliary Colic, Pancreatitis, BEAR

, Hepatitis, Bowel Obstruction, Cystitis, Mesenteric Ischemia, AAA, Appendicitis

, Renal Stone/Hydronephrosis, GERD, perforated viscous


Narrative Course


CBC & BMP Diagram


4/15/18 03:30








Total Protein 7.8, Albumin 3.7, Calcium Level 9.4, Alkaline Phosphatase 78, 

Aspartate Amino Transf (AST/SGOT) 32, Alanine Aminotransferase (ALT/SGPT) 25, 

Total Bilirubin 0.5





The patient is resting comfortably and feels better, is alert and in no 

distress. The patients results and examination findings were discussed.  The 

repeat examination is unremarkable and benign. The history, exam, diagnostic 

testing, and current condition do not suggest any significant pathology to 

warrant further testing, continued ED treatment, admission, or surgical 

evaluation at this point. The vital signs have been stable. The patient does 

not have uncontrollable pain, intractable vomiting, or other significant 

symptoms. The patient's condition is stable and appropriate for discharge. The 

patient will pursue further outpatient evaluation with a primary care physician 

or other designated or consulting physician as indicated in the discharge 

instructions. The patient expressed understanding and was agreeable with this 

plan.





Diagnosis





 Primary Impression:  


 Vomiting


 Qualified Codes:  R11.10 - Vomiting, unspecified


Referrals:  


Primary Care Physician


2 days


***Med/Other Pt SpecificInfo:  Prescription(s) given


Scripts


Ondansetron Odt (Zofran Odt) 4 Mg Tab


4 MG SL Q8HR Y for Nausea/Vomiting, #10 TAB 0 Refills


   Prov: Jose Ramon MD         4/15/18


Disposition:  01 DISCHARGE HOME


Condition:  Stable











Jose Ramon MD Apr 15, 2018 03:13

## 2018-05-12 ENCOUNTER — HOSPITAL ENCOUNTER (OUTPATIENT)
Dept: HOSPITAL 17 - NEPE | Age: 63
Setting detail: OBSERVATION
LOS: 1 days | Discharge: HOME | End: 2018-05-13
Payer: MEDICARE

## 2018-05-12 VITALS
SYSTOLIC BLOOD PRESSURE: 119 MMHG | OXYGEN SATURATION: 96 % | TEMPERATURE: 98.4 F | HEART RATE: 61 BPM | RESPIRATION RATE: 18 BRPM | DIASTOLIC BLOOD PRESSURE: 63 MMHG

## 2018-05-12 VITALS — RESPIRATION RATE: 18 BRPM | OXYGEN SATURATION: 94 %

## 2018-05-12 VITALS
OXYGEN SATURATION: 95 % | SYSTOLIC BLOOD PRESSURE: 143 MMHG | HEART RATE: 75 BPM | TEMPERATURE: 98.5 F | DIASTOLIC BLOOD PRESSURE: 66 MMHG | RESPIRATION RATE: 18 BRPM

## 2018-05-12 VITALS
HEART RATE: 81 BPM | RESPIRATION RATE: 18 BRPM | OXYGEN SATURATION: 94 % | DIASTOLIC BLOOD PRESSURE: 66 MMHG | SYSTOLIC BLOOD PRESSURE: 118 MMHG

## 2018-05-12 VITALS
RESPIRATION RATE: 18 BRPM | SYSTOLIC BLOOD PRESSURE: 118 MMHG | HEART RATE: 78 BPM | OXYGEN SATURATION: 96 % | DIASTOLIC BLOOD PRESSURE: 66 MMHG

## 2018-05-12 VITALS
OXYGEN SATURATION: 96 % | DIASTOLIC BLOOD PRESSURE: 84 MMHG | TEMPERATURE: 98.8 F | HEART RATE: 90 BPM | SYSTOLIC BLOOD PRESSURE: 159 MMHG | RESPIRATION RATE: 20 BRPM

## 2018-05-12 VITALS — OXYGEN SATURATION: 96 %

## 2018-05-12 VITALS — BODY MASS INDEX: 39.18 KG/M2 | HEIGHT: 69 IN | WEIGHT: 264.55 LBS

## 2018-05-12 DIAGNOSIS — R07.9: Primary | ICD-10-CM

## 2018-05-12 DIAGNOSIS — F17.210: ICD-10-CM

## 2018-05-12 DIAGNOSIS — Z87.442: ICD-10-CM

## 2018-05-12 DIAGNOSIS — F31.9: ICD-10-CM

## 2018-05-12 DIAGNOSIS — Z86.61: ICD-10-CM

## 2018-05-12 DIAGNOSIS — E78.5: ICD-10-CM

## 2018-05-12 DIAGNOSIS — G80.9: ICD-10-CM

## 2018-05-12 DIAGNOSIS — I10: ICD-10-CM

## 2018-05-12 LAB
ALBUMIN SERPL-MCNC: 3.4 GM/DL (ref 3.4–5)
ALP SERPL-CCNC: 86 U/L (ref 45–117)
ALT SERPL-CCNC: 22 U/L (ref 12–78)
AST SERPL-CCNC: 26 U/L (ref 15–37)
BASOPHILS # BLD AUTO: 0 TH/MM3 (ref 0–0.2)
BASOPHILS NFR BLD: 0.6 % (ref 0–2)
BILIRUB SERPL-MCNC: 0.3 MG/DL (ref 0.2–1)
BUN SERPL-MCNC: 14 MG/DL (ref 7–18)
CALCIUM SERPL-MCNC: 8.7 MG/DL (ref 8.5–10.1)
CHLORIDE SERPL-SCNC: 104 MEQ/L (ref 98–107)
CREAT SERPL-MCNC: 1.45 MG/DL (ref 0.6–1.3)
D-DIMER: 0.77 MG/L FEU (ref 0–0.5)
EOSINOPHIL # BLD: 0.1 TH/MM3 (ref 0–0.4)
EOSINOPHIL NFR BLD: 1.1 % (ref 0–4)
ERYTHROCYTE [DISTWIDTH] IN BLOOD BY AUTOMATED COUNT: 12.5 % (ref 11.6–17.2)
GFR SERPLBLD BASED ON 1.73 SQ M-ARVRAT: 49 ML/MIN (ref 89–?)
GLUCOSE SERPL-MCNC: 108 MG/DL (ref 74–106)
HCO3 BLD-SCNC: 28 MEQ/L (ref 21–32)
HCT VFR BLD CALC: 45.2 % (ref 39–51)
HGB BLD-MCNC: 15.5 GM/DL (ref 13–17)
INR PPP: 1.1 RATIO
LYMPHOCYTES # BLD AUTO: 3.6 TH/MM3 (ref 1–4.8)
LYMPHOCYTES NFR BLD AUTO: 41.9 % (ref 9–44)
MAGNESIUM SERPL-MCNC: 2.1 MG/DL (ref 1.5–2.5)
MCH RBC QN AUTO: 31.5 PG (ref 27–34)
MCHC RBC AUTO-ENTMCNC: 34.4 % (ref 32–36)
MCV RBC AUTO: 91.6 FL (ref 80–100)
MONOCYTE #: 0.7 TH/MM3 (ref 0–0.9)
MONOCYTES NFR BLD: 7.7 % (ref 0–8)
NEUTROPHILS # BLD AUTO: 4.2 TH/MM3 (ref 1.8–7.7)
NEUTROPHILS NFR BLD AUTO: 48.7 % (ref 16–70)
PLATELET # BLD: 260 TH/MM3 (ref 150–450)
PMV BLD AUTO: 6.7 FL (ref 7–11)
PROT SERPL-MCNC: 7.4 GM/DL (ref 6.4–8.2)
PROTHROMBIN TIME: 10.9 SEC (ref 9.8–11.6)
RBC # BLD AUTO: 4.94 MIL/MM3 (ref 4.5–5.9)
SODIUM SERPL-SCNC: 141 MEQ/L (ref 136–145)
TROPONIN I SERPL-MCNC: (no result) NG/ML (ref 0.02–0.05)
TROPONIN I SERPL-MCNC: (no result) NG/ML (ref 0.02–0.05)
WBC # BLD AUTO: 8.6 TH/MM3 (ref 4–11)

## 2018-05-12 PROCEDURE — G0378 HOSPITAL OBSERVATION PER HR: HCPCS

## 2018-05-12 PROCEDURE — 71045 X-RAY EXAM CHEST 1 VIEW: CPT

## 2018-05-12 PROCEDURE — 93005 ELECTROCARDIOGRAM TRACING: CPT

## 2018-05-12 PROCEDURE — 83735 ASSAY OF MAGNESIUM: CPT

## 2018-05-12 PROCEDURE — A9502 TC99M TETROFOSMIN: HCPCS

## 2018-05-12 PROCEDURE — 85025 COMPLETE CBC W/AUTO DIFF WBC: CPT

## 2018-05-12 PROCEDURE — 84484 ASSAY OF TROPONIN QUANT: CPT

## 2018-05-12 PROCEDURE — 78452 HT MUSCLE IMAGE SPECT MULT: CPT

## 2018-05-12 PROCEDURE — 85379 FIBRIN DEGRADATION QUANT: CPT

## 2018-05-12 PROCEDURE — 71275 CT ANGIOGRAPHY CHEST: CPT

## 2018-05-12 PROCEDURE — 85610 PROTHROMBIN TIME: CPT

## 2018-05-12 PROCEDURE — 99285 EMERGENCY DEPT VISIT HI MDM: CPT

## 2018-05-12 PROCEDURE — 80053 COMPREHEN METABOLIC PANEL: CPT

## 2018-05-12 PROCEDURE — 83690 ASSAY OF LIPASE: CPT

## 2018-05-12 PROCEDURE — 85730 THROMBOPLASTIN TIME PARTIAL: CPT

## 2018-05-12 PROCEDURE — 82552 ASSAY OF CPK IN BLOOD: CPT

## 2018-05-12 PROCEDURE — 93017 CV STRESS TEST TRACING ONLY: CPT

## 2018-05-12 PROCEDURE — 82550 ASSAY OF CK (CPK): CPT

## 2018-05-12 RX ADMIN — Medication SCH ML: at 22:41

## 2018-05-12 NOTE — RADRPT
EXAM DATE/TIME:  05/12/2018 18:23 

 

[facility] COMPARISON:     

No previous studies available for comparison.

 

 

INDICATIONS :     

Chest pain and shortness of breath past week.

                    

 

IV CONTRAST:     

74 cc Omnipaque 350 (iohexol) IV 

 

 

RADIATION DOSE:     

23.48 CTDIvol (mGy) 

 

 

MEDICAL HISTORY :     

Cardiovascular disease. Hypertension. Gastroesophageal reflux disease.Cerebral Palsy Renal stones

 

SURGICAL HISTORY :      

Total knee replacement, left. Total knee replacement, right.

 

ENCOUNTER:      

Initial

 

ACUITY:      

1 week

 

PAIN SCALE:      

10/10

 

LOCATION:       

Bilateral chest 

 

TECHNIQUE:     

Volumetric scanning of the chest was performed using a pulmonary embolism protocol MIP images were re
constructed.  Using automated exposure control and adjustment of the mA and/or kV according to patien
t size, radiation dose was kept as low as reasonably achievable to obtain optimal diagnostic quality 
images.   DICOM format image data is available electronically for review and comparison.  

 

Follow-up recommendations for detected pulmonary nodules are based at a minimum on nodule size and pa
tient risk factors according to Fleischner Society Guidelines.

 

FINDINGS:     

 

PULMONARY ARTERIES:

No definite filling defects are seen in the pulmonary arteries through the segmental level.

 

LUNGS:     

There is no consolidation or pneumothorax .  No concerning pulmonary nodule is visualized.

 

PLEURAE:     

There is no pleural thickening or pleural effusion.

 

MEDIASTINUM:     

There is good visualization of the great vessels of the middle mediastinum.  No evidence of mediastin
al or hilar adenopathy/mass.

 

MUSCULOSKELETAL:     

Within normal limits for patient age.

 

MISCELLANEOUS:     

The visualized upper abdominal organs demonstrate no acute abnormality.

 

CONCLUSION:     

1. No definite PE.

2. No focal or acute pulmonary infiltrates.

 

 

 

 

 Baldemar Nix MD on May 12, 2018 at 18:38           

Board Certified Radiologist.

 This report was verified electronically.

## 2018-05-12 NOTE — PD
HPI


Chief Complaint:  Chest Pain


Time Seen by Provider:  16:35


Travel History


International Travel<30 days:  No


Contact w/Intl Traveler<30days:  No


Traveled to known affect area:  No





History of Present Illness


HPI


63-year-old male with history of bipolar disorder, 1 pack per day smoker, here 

for evaluation of chest pain and dyspnea.  The patient reports having shortness 

of breath over the last week.  He reports shortness of breath at rest, worse 

with exertion.  States that he started having chest pain this morning.  He 

reports left-sided chest tightness/heaviness, 10 out of 10, constant, radiates 

to his left shoulder, worse with exertion.  He denies any history of cardiac 

disease.  States that he has had a cough that has been nonproductive for about 

a week.  No hemoptysis.  No paresthesias or motor deficits.





PFSH


Past Medical History


Arthritis:  Yes


Asthma:  No


Autoimmune Disease:  No


Blood Disorders:  No


Bipolar Disorder:  Yes


Anxiety:  Yes


Depression:  No


Heart Rhythm Problems:  No


Cancer:  No


Cardiovascular Problems:  Yes (HTN )


Cerebral Palsy:  Yes


High Cholesterol:  Yes


Chemotherapy:  No


Chest Pain:  No


Congestive Heart Failure:  No


COPD:  No


Cerebrovascular Accident:  No


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


Diabetes:  No


Diminished Hearing:  No


Diverticulitis:  Yes


Endocrine:  No


Gastrointestinal Disorders:  Yes (DIVERTICULITIS)


GERD:  Yes


Glaucoma:  No


Genitourinary:  No


Headaches:  No


Hepatitis:  No


Hiatal Hernia:  No


Heparin Induced Thrombocytopen:  No


Hypertension:  Yes


Immune Disorder:  No


Implanted Vascular Access Dvce:  Yes (PT STATES "TWO TOTAL KNEE REPLACEMENTS")


Kidney Stones:  Yes (WITH LITHOTRIPSY)


Musculoskeletal:  Yes (CEREBRAL PALSY)


Neurologic:  Yes


Psychiatric:  Yes


Reproductive:  No


Respiratory:  No


Immunizations Current:  Yes


Migraines:  No


Myocardial Infarction:  No


Radiation Therapy:  No


Renal Failure:  No


Seizures:  No


Sickle Cell Disease:  No


Sleep Apnea:  No


Thyroid Disease:  No


Ulcer:  No


PNEUMOCCOCAL Vaccine (Year):  2010





Past Surgical History


Abdominal Surgery:  No


AICD:  No


Appendectomy:  No


Arteriovenous Shunt:  No


Cardiac Surgery:  No


Cholecystectomy:  No


Ear Surgery:  No


Endocrine Surgery:  No


Eye Surgery:  No


Genitourinary Surgery:  No


Gynecologic Surgery:  No


Insulin Pump:  No


Joint Replacement:  Yes (BILATERAL KNEES)


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 

now per patient repor)


Oral Surgery:  No


Pacemaker:  No


Thoracic Surgery:  No


Tonsillectomy:  Yes


Other Surgery:  Yes





Social History


Alcohol Use:  No


Tobacco Use:  Yes (1ppd)


Substance Use:  No





Allergies-Medications


(Allergen,Severity, Reaction):  


Coded Allergies:  


     cephalexin (Unverified  Allergy, Severe, RASH, 4/15/18)


 Keflex


Reported Meds & Prescriptions





Reported Meds & Active Scripts


Active


Reported


Hydroxyzine Pamoate 50 Mg Cap 50 Mg PO HS


Bupropion HCl 100 Mg Tab 150 Mg PO HS


Ziprasidone 60 Mg Cap 120 Mg PO HS








Review of Systems


Except as stated in HPI:  all other systems reviewed are Neg





Physical Exam


Narrative


GENERAL: Well-developed, well-nourished, awake, alert, overweight, comfortable, 

no apparent distress.


SKIN: Focused skin assessment warm/dry.


HEAD: Atraumatic. Normocephalic. 


EYES: Pupils equal and round. No scleral icterus. No injection or drainage. 


ENT: Mucous membranes pink and moist.


NECK: Trachea midline. No JVD. 


CARDIOVASCULAR: Regular rate and rhythm.  


RESPIRATORY: No accessory muscle use. Clear to auscultation. Breath sounds 

equal bilaterally. 


GASTROINTESTINAL: Abdomen soft, non-tender, nondistended. 


MUSCULOSKELETAL: No obvious deformities. No clubbing.  No cyanosis.  Moderate 

bilateral lower extremity edema.


NEUROLOGICAL: Awake and alert. No obvious cranial nerve deficits.  Motor 

grossly within normal limits. Normal speech.


PSYCHIATRIC: Appropriate mood and affect; insight and judgment normal.





Data


Data


Last Documented VS





Vital Signs








  Date Time  Temp Pulse Resp B/P (MAP) Pulse Ox O2 Delivery O2 Flow Rate FiO2


 


5/12/18 17:58  78 18 118/66 (83) 96 Room Air  


 


5/12/18 16:03 98.8       








Orders





 Orders


Ckmb (Isoenzyme) Profile (5/12/18 16:38)


Complete Blood Count With Diff (5/12/18 16:38)


Comprehensive Metabolic Panel (5/12/18 16:38)


D-Dimer (5/12/18 16:38)


Magnesium (Mg) (5/12/18 16:38)


Prothrombin Time / Inr (Pt) (5/12/18 16:38)


Act Partial Throm Time (Ptt) (5/12/18 16:38)


Troponin I (5/12/18 16:38)


Lipase (5/12/18 16:38)


Chest, Single Ap (5/12/18 16:38)


Ecg Monitoring (5/12/18 16:38)


Iv Access Insert/Monitor (5/12/18 16:38)


Oximetry (5/12/18 16:38)


Aspirin Chew (Aspirin Chew) (5/12/18 16:45)


Sodium Chloride 0.9% Flush (Ns Flush) (5/12/18 16:45)


Nitroglycerin Sl (Nitrostat Sl) (5/12/18 16:45)


CKMB (5/12/18 16:58)


CKMB% (5/12/18 16:58)


Ct Pulmonary Angiogram (5/12/18 )


Iohexol 350 Inj (Omnipaque 350 Inj) (5/12/18 16:00)


Admit Order (Ed Use Only) (5/12/18 19:27)


Activity Bed Rest With Brp (5/12/18 19:29)


Vital Signs (Adult) Q4H (5/12/18 19:29)


Cardiac Rhythm .As Directed (5/12/18 19:29)


Notify Dr: Other .PRN (5/12/18 19:29)


Notify DrUsman Parameters (5/12/18 19:29)


Resp Oxygen Nasal Cannula (5/12/18 )


Ckmb (Isoenzyme) Profile (5/12/18 19:29)


Ckmb (Isoenzyme) Profile (5/12/18 22:29)


Troponin I (5/12/18 19:29)


Troponin I (5/12/18 22:29)


Electrocardiogram (5/12/18 19:29)


Electrocardiogram (5/12/18 22:29)


^ Obtain (5/12/18 19:29)


Sodium Chloride 0.9% Flush (Ns Flush) (5/12/18 19:30)


Sodium Chloride 0.9% Flush (Ns Flush) (5/12/18 21:00)


Cardiac Monitor / Telemetry ROSA.Q8H (5/12/18 19:29)





Labs





Laboratory Tests








Test


  5/12/18


16:58


 


White Blood Count 8.6 TH/MM3 


 


Red Blood Count 4.94 MIL/MM3 


 


Hemoglobin 15.5 GM/DL 


 


Hematocrit 45.2 % 


 


Mean Corpuscular Volume 91.6 FL 


 


Mean Corpuscular Hemoglobin 31.5 PG 


 


Mean Corpuscular Hemoglobin


Concent 34.4 % 


 


 


Red Cell Distribution Width 12.5 % 


 


Platelet Count 260 TH/MM3 


 


Mean Platelet Volume 6.7 FL 


 


Neutrophils (%) (Auto) 48.7 % 


 


Lymphocytes (%) (Auto) 41.9 % 


 


Monocytes (%) (Auto) 7.7 % 


 


Eosinophils (%) (Auto) 1.1 % 


 


Basophils (%) (Auto) 0.6 % 


 


Neutrophils # (Auto) 4.2 TH/MM3 


 


Lymphocytes # (Auto) 3.6 TH/MM3 


 


Monocytes # (Auto) 0.7 TH/MM3 


 


Eosinophils # (Auto) 0.1 TH/MM3 


 


Basophils # (Auto) 0.0 TH/MM3 


 


CBC Comment DIFF FINAL 


 


Differential Comment  


 


Prothrombin Time 10.9 SEC 


 


Prothromb Time International


Ratio 1.1 RATIO 


 


 


Activated Partial


Thromboplast Time 29.2 SEC 


 


 


D-Dimer Quantitative (PE/DVT) 0.77 MG/L FEU 


 


Blood Urea Nitrogen 14 MG/DL 


 


Creatinine 1.45 MG/DL 


 


Random Glucose 108 MG/DL 


 


Total Protein 7.4 GM/DL 


 


Albumin 3.4 GM/DL 


 


Calcium Level 8.7 MG/DL 


 


Magnesium Level 2.1 MG/DL 


 


Alkaline Phosphatase 86 U/L 


 


Aspartate Amino Transf


(AST/SGOT) 26 U/L 


 


 


Alanine Aminotransferase


(ALT/SGPT) 22 U/L 


 


 


Total Bilirubin 0.3 MG/DL 


 


Sodium Level 141 MEQ/L 


 


Potassium Level 4.0 MEQ/L 


 


Chloride Level 104 MEQ/L 


 


Carbon Dioxide Level 28.0 MEQ/L 


 


Anion Gap 9 MEQ/L 


 


Estimat Glomerular Filtration


Rate 49 ML/MIN 


 


 


Total Creatine Kinase 276 U/L 


 


Creatine Kinase MB 2.4 NG/ML 


 


Troponin I


  LESS THAN 0.02


NG/ML


 


Lipase 167 U/L 











MDM


Medical Decision Making


Medical Screen Exam Complete:  Yes


Emergency Medical Condition:  Yes


Medical Record Reviewed:  Yes


Interpretation(s)


EKG: Sinus, rate 77, normal axis, normal intervals, no acute ischemic 

abnormality.


Differential Diagnosis


ACS, pneumothorax, pericarditis, PE, pneumonia, pulmonary edema


Narrative Course


Vital signs show heart rate 90, blood pressure 159/84, pulse ox 96% on room air

, oral temperature 98.8F.





CBC is unremarkable.


CMP is remarkable for creatinine 1.45, GFR 49, otherwise unremarkable.


Cardiac enzymes are negative.


Lipase is 167.





D-dimer slightly elevated at 0.77.





Chest x-ray:


Minimal parenchymal changes at the left base new from comparison study.





CT pulmonary angiogram:


CONCLUSION:     


1. No definite PE.


2. No focal or acute pulmonary infiltrates.





The patient had moderate improvement in pain after nitro.  He was given a full 

aspirin.  He was made aware of all findings.  He still continues to have some 

left-sided chest pain.  He will be admitted to the chest pain center for 

further cardiac evaluation.





Diagnosis





 Primary Impression:  


 Chest pain


 Qualified Codes:  R07.9 - Chest pain, unspecified





Admitting Information


Admitting Physician Requests:  Observation











Ta Vasquez MD May 12, 2018 16:43

## 2018-05-13 VITALS
HEART RATE: 64 BPM | DIASTOLIC BLOOD PRESSURE: 70 MMHG | TEMPERATURE: 98.4 F | RESPIRATION RATE: 16 BRPM | SYSTOLIC BLOOD PRESSURE: 127 MMHG | OXYGEN SATURATION: 95 %

## 2018-05-13 VITALS
HEART RATE: 72 BPM | RESPIRATION RATE: 20 BRPM | TEMPERATURE: 97.9 F | DIASTOLIC BLOOD PRESSURE: 55 MMHG | SYSTOLIC BLOOD PRESSURE: 133 MMHG | OXYGEN SATURATION: 96 %

## 2018-05-13 VITALS
RESPIRATION RATE: 20 BRPM | SYSTOLIC BLOOD PRESSURE: 125 MMHG | HEART RATE: 68 BPM | TEMPERATURE: 98.9 F | DIASTOLIC BLOOD PRESSURE: 62 MMHG | OXYGEN SATURATION: 96 %

## 2018-05-13 VITALS — HEART RATE: 67 BPM

## 2018-05-13 VITALS — HEART RATE: 63 BPM

## 2018-05-13 LAB — TROPONIN I SERPL-MCNC: (no result) NG/ML (ref 0.02–0.05)

## 2018-05-13 RX ADMIN — Medication SCH ML: at 09:51

## 2018-05-13 NOTE — EKG
Date Performed: 05/12/2018       Time Performed: 23:24:24

 

PTAGE:      63 years

 

EKG:      Sinus rhythm 

 

 NORMAL ECG

 

PREVIOUS TRACING       : 05/12/2018 16.30

 

DOCTOR:   Vern Garcia  Interpretating Date/Time  05/13/2018 12:50:54

## 2018-05-13 NOTE — PD.CARD.PN
Subjective


Subjective Remarks


63-year-old bipolar patient who presented because of dyspnea on exertion and 

some chest pain.  The patient was discussed with PA, medical records were 

reviewed, laboratory and radiographic data reviewed and then the patient was 

seen and examined personally.


Discussed plan and treatment with the physician extender.  I am in agreement 

with the documentation as recorded at this point.





Objective


Medications





Current Medications








 Medications


  (Trade)  Dose


 Ordered  Sig/Amish


 Route  Start Time


 Stop Time Status Last Admin


 


  (NS Flush)  2 ml  UNSCH  PRN


 IVF  5/12/18 16:45


     


 


 


  (NS Flush)  2 ml  UNSCH  PRN


 IV FLUSH  5/12/18 19:30


     


 


 


  (NS Flush)  2 ml  BID


 IV FLUSH  5/12/18 21:00


    5/13/18 09:51


 








Vital Signs / I&O





Vital Signs








  Date Time  Temp Pulse Resp B/P (MAP) Pulse Ox O2 Delivery O2 Flow Rate FiO2


 


5/13/18 08:35  63      


 


5/13/18 08:35  63      


 


5/13/18 08:14 97.9 72 20 133/55 (81) 96   


 


5/13/18 03:12 98.4 64 16 127/70 (89) 95   


 


5/12/18 23:16 98.4 61 18 119/63 (81) 96   


 


5/12/18 21:18 98.5 75 18 143/66 (91) 95   


 


5/12/18 19:39     96   


 


5/12/18 17:58  78 18 118/66 (83) 96 Room Air  


 


5/12/18 17:51  89 18  98 Room Air  


 


5/12/18 17:10  81 18 118/66 (83) 94 Room Air  


 


5/12/18 17:01   18  94 Room Air  


 


5/12/18 16:03 98.8 90 20 159/84 (109) 96   








Physical Exam


Obese somewhat odiferous but pleasant patient in no acute distress.


Chest is clear


Cardiovascular reveals a regular sinus rhythm with no gallop rub or murmur 

abdomen is obese soft nontender


Laboratory





Laboratory Tests








Test


  5/12/18


16:58 5/12/18


22:40 5/13/18


04:30


 


White Blood Count 8.6 TH/MM3   


 


Red Blood Count 4.94 MIL/MM3   


 


Hemoglobin 15.5 GM/DL   


 


Hematocrit 45.2 %   


 


Mean Corpuscular Volume 91.6 FL   


 


Mean Corpuscular Hemoglobin 31.5 PG   


 


Mean Corpuscular Hemoglobin


Concent 34.4 % 


  


  


 


 


Red Cell Distribution Width 12.5 %   


 


Platelet Count 260 TH/MM3   


 


Mean Platelet Volume 6.7 FL   


 


Neutrophils (%) (Auto) 48.7 %   


 


Lymphocytes (%) (Auto) 41.9 %   


 


Monocytes (%) (Auto) 7.7 %   


 


Eosinophils (%) (Auto) 1.1 %   


 


Basophils (%) (Auto) 0.6 %   


 


Neutrophils # (Auto) 4.2 TH/MM3   


 


Lymphocytes # (Auto) 3.6 TH/MM3   


 


Monocytes # (Auto) 0.7 TH/MM3   


 


Eosinophils # (Auto) 0.1 TH/MM3   


 


Basophils # (Auto) 0.0 TH/MM3   


 


CBC Comment DIFF FINAL   


 


Differential Comment    


 


Prothrombin Time 10.9 SEC   


 


Prothromb Time International


Ratio 1.1 RATIO 


  


  


 


 


Activated Partial


Thromboplast Time 29.2 SEC 


  


  


 


 


D-Dimer Quantitative (PE/DVT) 0.77 MG/L FEU   


 


Blood Urea Nitrogen 14 MG/DL   


 


Creatinine 1.45 MG/DL   


 


Random Glucose 108 MG/DL   


 


Total Protein 7.4 GM/DL   


 


Albumin 3.4 GM/DL   


 


Calcium Level 8.7 MG/DL   


 


Magnesium Level 2.1 MG/DL   


 


Alkaline Phosphatase 86 U/L   


 


Aspartate Amino Transf


(AST/SGOT) 26 U/L 


  


  


 


 


Alanine Aminotransferase


(ALT/SGPT) 22 U/L 


  


  


 


 


Total Bilirubin 0.3 MG/DL   


 


Sodium Level 141 MEQ/L   


 


Potassium Level 4.0 MEQ/L   


 


Chloride Level 104 MEQ/L   


 


Carbon Dioxide Level 28.0 MEQ/L   


 


Anion Gap 9 MEQ/L   


 


Estimat Glomerular Filtration


Rate 49 ML/MIN 


  


  


 


 


Total Creatine Kinase 276 U/L  283 U/L  250 U/L 


 


Creatine Kinase MB 2.4 NG/ML  1.8 NG/ML  1.8 NG/ML 


 


Troponin I


  LESS THAN 0.02


NG/ML LESS THAN 0.02


NG/ML LESS THAN 0.02


NG/ML


 


Lipase 167 U/L   








Imaging





Last 24 hours Impressions








Chest X-Ray 5/12/18 1638 Signed





Impressions: 





 Service Date/Time:  Saturday, May 12, 2018 16:57 - CONCLUSION:  Minimal 





 parenchymal changes left base new from comparison study     Gamaliel Schwartz MD  





 [facility]











Assessment and Plan


Assessment and Plan


Rule out using chest pain center protocol and obtain nuclear stress test.  If 

negative discharge home with outpatient follow-up.  If positive will consider 

admission.


Discussed Condition With


This plan was discussed with the patient when he is in agreement.











Vern Garcia MD May 13, 2018 12:20

## 2018-05-13 NOTE — HHI.HP
HPI


Primary Care Physician


Tamela Perdomo


Chief Complaint


Chest pain


History of Present Illness


This is a 63-year-old male with history of hypertension, hyperlipidemia, 

cerebral palsy, bipolar disorder, and tobacco abuse that presents to ED to be 

evaluated for chest discomfort he states has been constantly present for 1 

week.  Describes as a tightness.  Points to left chest.  Really cannot relate 

any worsening or improving his symptoms but he believes that the first time it 

occurred was while he was pushing his walker with 20 pounds of trish litter on 

the walker.  Has had intermittent shortness of breath.  No nausea or 

diaphoresis.  He did not try any remedies at home for the discomfort.  Has had 

no recent fever.  He states he had a cough for about 5 or 6 days but that 

resolved 2 weeks ago.  Denies recent travel.  He states that he has had a 

stress test in the past but it has been many years.





Review of Systems


General: Patient denies fevers, chills, and recent travel.


HEENT: Patient denies headache, sore throat, difficulty swallowing.  


Cardiovascular: Has the chest discomfort as mentioned above.  Denies sensation 

of heart beating rapidly or irregularly.  No syncope.  Denies diaphoresis.


Respiratory: Occasional shortness of breath.  Denies inspirational chest 

discomfort.  Denies coughing wheezing or hemoptysis.  


GI: Patient denies nausea, vomiting, diarrhea, abdominal pain, bloody stools.


Musculoskeletal: Patient denies joint pain or edema.  Denies calf pain or edema.


Neurovascular: Patient denies numbness, tingling, weakness in extremities.  

Denies headache.


Endocrine: Denies polyuria and polydipsia.


Hematologic: Denies easy bruising.


Skin: Denies rash or itching.





Past Family Social History


Allergies:  


Coded Allergies:  


     cephalexin (Unverified  Allergy, Severe, RASH, 4/15/18)


 Keflex


Past Medical History


Hypertension however he takes no medication.  Hyperlipidemia which she also 

takes no medication.  Cerebral palsy.  Tobacco abuse.  Denies diabetes and 

known CAD.


Past Surgical History


Bilateral knees.  Tonsillectomy.


Reported Medications





Reported Meds & Active Scripts


Active


Reported


Hydroxyzine Pamoate 50 Mg Cap 50 Mg PO HS


Bupropion HCl 100 Mg Tab 150 Mg PO HS


Ziprasidone 60 Mg Cap 120 Mg PO HS


Active Ordered Medications





Current Medications








 Medications


  (Trade)  Dose


 Ordered  Sig/Amish


 Route  Start Time


 Stop Time Status Last Admin


 


  (NS Flush)  2 ml  UNSCH  PRN


 IVF  18 16:45


     


 


 


  (NS Flush)  2 ml  UNSCH  PRN


 IV FLUSH  18 19:30


     


 


 


  (NS Flush)  2 ml  BID


 IV FLUSH  18 21:00


    18 22:41


 








Family History


Denies family history of CAD.


Social History


Began smoking 3 years ago and smokes 1 pack of cigarettes daily.  Denies 

alcohol or illicit drug use.





Physical Exam


Vital Signs





Vital Signs








  Date Time  Temp Pulse Resp B/P (MAP) Pulse Ox O2 Delivery O2 Flow Rate FiO2


 


18 08:35  63      


 


18 08:35  63      


 


18 08:14 97.9 72 20 133/55 (81) 96   


 


18 03:12 98.4 64 16 127/70 (89) 95   


 


18 23:16 98.4 61 18 119/63 (81) 96   


 


18 21:18 98.5 75 18 143/66 (91) 95   


 


18 19:39     96   


 


18 17:58  78 18 118/66 (83) 96 Room Air  


 


18 17:51  89 18  98 Room Air  


 


18 17:10  81 18 118/66 (83) 94 Room Air  


 


18 17:01   18  94 Room Air  


 


18 16:03 98.8 90 20 159/84 (109) 96   








Physical Exam


GENERAL: This is a well-nourished, well-developed patient, in no apparent 

distress.  Patient speaks in clear complete sentences.  Patient is pleasant.


HEENT: Head is atraumatic and normocephalic.  Neck is supple without 

lymphadenopathy and trachea is midline.  No JVD or carotid bruits.


CARDIOVASCULAR: Regular rate and rhythm without murmurs, gallops, or rubs. 


RESPIRATORY: Clear to auscultation. Breath sounds equal bilaterally. No wheezes

, rales, or rhonchi.  Chest wall is tender.  No use of accessory muscles.


GASTROINTESTINAL: Abdomen is nontender, nondistended.  Abdomen soft.  No 

obvious pulsatile mass or bruit.  No CVA tenderness.  Strong femoral pulses 

bilaterally.  Normal bowel sounds in all quadrants.


MUSCULOSKELETAL: Patient is moving upper and lower extremities freely.  No calf 

tenderness or edema, no Homans sign.  Strong pulses in upper and lower 

extremities.


NEUROLOGICAL: Patient is alert and oriented.  Cranial nerves 2-12 are grossly 

intact.  No focal deficits and speech is clear.


SKIN: No rash and turgor is normal.


Laboratory





Laboratory Tests








Test


  18


16:58 18


22:40 18


04:30


 


White Blood Count 8.6   


 


Red Blood Count 4.94   


 


Hemoglobin 15.5   


 


Hematocrit 45.2   


 


Mean Corpuscular Volume 91.6   


 


Mean Corpuscular Hemoglobin 31.5   


 


Mean Corpuscular Hemoglobin


Concent 34.4 


  


  


 


 


Red Cell Distribution Width 12.5   


 


Platelet Count 260   


 


Mean Platelet Volume 6.7   


 


Neutrophils (%) (Auto) 48.7   


 


Lymphocytes (%) (Auto) 41.9   


 


Monocytes (%) (Auto) 7.7   


 


Eosinophils (%) (Auto) 1.1   


 


Basophils (%) (Auto) 0.6   


 


Neutrophils # (Auto) 4.2   


 


Lymphocytes # (Auto) 3.6   


 


Monocytes # (Auto) 0.7   


 


Eosinophils # (Auto) 0.1   


 


Basophils # (Auto) 0.0   


 


CBC Comment DIFF FINAL   


 


Differential Comment    


 


Prothrombin Time 10.9   


 


Prothromb Time International


Ratio 1.1 


  


  


 


 


Activated Partial


Thromboplast Time 29.2 


  


  


 


 


D-Dimer Quantitative (PE/DVT) 0.77   


 


Blood Urea Nitrogen 14   


 


Creatinine 1.45   


 


Random Glucose 108   


 


Total Protein 7.4   


 


Albumin 3.4   


 


Calcium Level 8.7   


 


Magnesium Level 2.1   


 


Alkaline Phosphatase 86   


 


Aspartate Amino Transf


(AST/SGOT) 26 


  


  


 


 


Alanine Aminotransferase


(ALT/SGPT) 22 


  


  


 


 


Total Bilirubin 0.3   


 


Sodium Level 141   


 


Potassium Level 4.0   


 


Chloride Level 104   


 


Carbon Dioxide Level 28.0   


 


Anion Gap 9   


 


Estimat Glomerular Filtration


Rate 49 


  


  


 


 


Total Creatine Kinase 276  283  250 


 


Creatine Kinase MB 2.4  1.8  1.8 


 


Troponin I LESS THAN 0.02  LESS THAN 0.02  LESS THAN 0.02 


 


Lipase 167   








Result Diagram:  


18 1658                                                                   

             18 1658





Imaging





Last 72 hours Impressions








Chest X-Ray 18 1638 Signed





Impressions: 





 Service Date/Time:  Saturday, May 12, 2018 16:57 - CONCLUSION:  Minimal 





 parenchymal changes left base new from comparison study     Gamaliel Schwartz MD  





 FACR


 


CT Angiography 18 0000 Signed





Impressions: 





 Service Date/Time:  Saturday, May 12, 2018 18:23 - CONCLUSION:  1. No definite 





 PE. 2. No focal or acute pulmonary infiltrates.      Baldemar J. Siragusa, MD 








Course


EKGs are sinus rhythm without significant ST segment depressions or elevations.





Caprini VTE Risk Assessment


Caprini VTE Risk Assessment:  Mod/High Risk (score >= 2)


Caprini Risk Assessment Model











 Point Value = 1          Point Value = 2  Point Value = 3  Point Value = 5


 


Age 41-60


Minor surgery


BMI > 25 kg/m2


Swollen legs


Varicose veins


Pregnancy or postpartum


History of unexplained or recurrent


   spontaneous 


Oral contraceptives or hormone


   replacement


Sepsis (< 1 month)


Serious lung disease, including


   pneumonia (< 1 month)


Abnormal pulmonary function


Acute myocardial infarction


Congestive heart failure (< 1 month)


History of inflammatory bowel disease


Medical patient at bed rest Age 61-74


Arthroscopic surgery


Major open surgery (> 45 min)


Laparoscopic surgery (> 45 min)


Malignancy


Confined to bed (> 72 hours)


Immobilizing plaster cast


Central venous access Age >= 75


History of VTE


Family history of VTE


Factor V Leiden


Prothrombin 23717S


Lupus anticoagulant


Anticardiolipin antibodies


Elevated serum homocysteine


Heparin-induced thrombocytopenia


Other congenital or acquired


   thrombophilia Stroke (< 1 month)


Elective arthroplasty


Hip, pelvis, or leg fracture


Acute spinal cord injury (< 1 month)








Prophylaxis Regimen











   Total Risk


Factor Score Risk Level Prophylaxis Regimen


 


0-1      Low Early ambulation


 


2 Moderate Order ONE of the following:


*Sequential Compression Device (SCD)


*Heparin 5000 units SQ BID


 


3-4 Higher Order ONE of the following medications:


*Heparin 5000 units SQ TID


*Enoxaparin/Lovenox 40 mg SQ daily (WT < 150 kg, CrCl > 30 mL/min)


*Enoxaparin/Lovenox 30 mg SQ daily (WT < 150 kg, CrCl > 10-29 mL/min)


*Enoxaparin/Lovenox 30 mg SQ BID (WT < 150 kg, CrCl > 30 mL/min)


AND/OR


*Sequential Compression Device (SCD)


 


5 or more Highest Order ONE of the following medications:


*Heparin 5000 units SQ TID (Preferred with Epidurals)


*Enoxaparin/Lovenox 40 mg SQ daily (WT < 150 kg, CrCl > 30 mL/min)


*Enoxaparin/Lovenox 30 mg SQ daily (WT < 150 kg, CrCl > 10-29 mL/min)


*Enoxaparin/Lovenox 30 mg SQ BID (WT < 150 kg, CrCl > 30 mL/min)


AND


*Sequential Compression Device (SCD)











Assessment and Plan


Assessment and Plan


* Chest pain: Patient's symptoms are atypical however he has risk factors for 

CAD.  Patient has had serial cardiac enzymes and EKGs for ruling out purposes.  

He will be seen by Dr. Garcia of cardiology in the chest pain center.  He will 

undergo a Lexiscan.  He would be discharged home with a stress test is 

nonischemic with instructions to follow-up with PCP.  Return to ED for interval 

issues.


* Tobacco abuse: Patient has been counseled on importance of smoking cessation.


* Hypertension: States he has history of hypertension but has never been 

medicated.  His blood pressures have been okay.  We will continue to monitor.  

He should keep a journal at home blood pressure recordings to discuss with his 

PCP.


* Hyperlipidemia: Also patient states takes a medication.  He should discuss 

this with his PCP.  He should optimize medical management of his hyperlipidemia.


Patient is stable at this time.  He is agreeable to this plan.











Van Hazel May 13, 2018 09:15

## 2018-05-13 NOTE — RADRPT
EXAM DATE/TIME:  05/13/2018 10:26 

 

[facility] COMPARISON:     

No previous studies available for comparison.

 

 

INDICATIONS :     

Left sided chest pain. Angina. 

                           

 

DOSE:     

35 mCi Tc99m Myoview at stress.

                     11 mCi Tc99m Myoview at rest.

                     0.4 mg Lexiscan

                       

 

 

STRESS SYMPTOMS:      

Dyspnea and dizziness.

                       

 

 

EJECTION FRACTION:       

53%

                       

 

MEDICAL HISTORY :        

Cerebral palsy.

 

SURGICAL HISTORY :         

Bilateral knee and left ankle surgery.

 

ENCOUNTER:     

Initial

 

ACUITY:     

1 day

 

PAIN SCALE:     

4/10

 

LOCATION:      

Left chest 

 

TECHNIQUE:     

The patient underwent pharmacologic stress with infusion of prescribed dose.  Continuous ECG tracing 
was monitored during stress.  Gated SPECT imaging was performed after stress and conventional SPECT i
maging was performed at rest.  The examination was performed on a SPECT/CT scanner, both attenuation 
and non-corrected datasets were reviewed.

 

FINDINGS:     

The best perfused myocardium is the lateral wall.  There is no redistribution to suggest stress-induc
ed ischemia.

The ejection fraction is 53% with minimal inferior wall hypokinesis.

 

CONCLUSION:     

Negative for stress-induced ischemia.  Minimally depressed ejection fraction.  Correlation is suggest
ed.

 

RISK CATEGORY:     Low (<1% Annual Mortality Rate)

 

 

 

 

 Gamaleil Schwartz MD FACR on May 13, 2018 at 12:20           

Board Certified Radiologist.

 This report was verified electronically.

## 2018-05-13 NOTE — TR
Date Performed: 05/13/2018       Time Performed: 11:06:48

 

DOCTOR:      Vern Garcia 

 

DRUG LIST:     

CLINICAL HISTORY:     

REASON FOR TEST:      CHEST  PAIN

REASON FOR ENDING:     

OBSERVATION:     

CONCLUSION:      Lexiscan stress test was performed under standard four minute protocol. Radionuclide
 was injected one minute prior to ending the test. No electrocardiographic abormalities were present 
to suggest ischemia. Nuclear imaging and interpretation are pending.

COMMENTS:

## 2018-05-13 NOTE — EKG
Date Performed: 05/12/2018       Time Performed: 16:30:21

 

PTAGE:      63 years

 

EKG:      Sinus rhythm 

 

 NORMAL ECG

 

PREVIOUS TRACING       : 04/15/2018 06.22

 

DOCTOR:   Vern Garcia  Interpretating Date/Time  05/13/2018 12:51:28

## 2018-05-13 NOTE — HHI.DCPOC
Discharge Care Plan


Diagnosis:  


(1) Chest pain


(2) Tobacco abuse


Goals to Promote Your Health


* To prevent worsening of your condition and complications


* To maintain your health at the optimal level


Directions to Meet Your Goals


*** Take your medications as prescribed


*** Follow your dietary instruction


*** Follow activity as directed








*** Keep your appointments as scheduled


*** Take your immunizations and boosters as scheduled


*** If your symptoms worsen call your PCP, if no PCP go to Urgent Care Center 

or Emergency Room***


*** Smoking is Dangerous to Your Health. Avoid second hand smoke***


***Call the 24-hour hour crisis hotline for domestic abuse at 1-106.367.3208***











Van Hazel May 13, 2018 13:58